# Patient Record
Sex: MALE | Race: BLACK OR AFRICAN AMERICAN | NOT HISPANIC OR LATINO | URBAN - METROPOLITAN AREA
[De-identification: names, ages, dates, MRNs, and addresses within clinical notes are randomized per-mention and may not be internally consistent; named-entity substitution may affect disease eponyms.]

---

## 2024-01-01 ENCOUNTER — INPATIENT (INPATIENT)
Facility: HOSPITAL | Age: 79
LOS: 5 days | End: 2024-08-08
Attending: INTERNAL MEDICINE | Admitting: INTERNAL MEDICINE
Payer: MEDICARE

## 2024-01-01 VITALS — HEIGHT: 67 IN | WEIGHT: 132.28 LBS

## 2024-01-01 DIAGNOSIS — G20.A1 PARKINSON'S DISEASE WITHOUT DYSKINESIA, WITHOUT MENTION OF FLUCTUATIONS: ICD-10-CM

## 2024-01-01 DIAGNOSIS — J96.02 ACUTE RESPIRATORY FAILURE WITH HYPERCAPNIA: ICD-10-CM

## 2024-01-01 DIAGNOSIS — I46.8 CARDIAC ARREST DUE TO OTHER UNDERLYING CONDITION: ICD-10-CM

## 2024-01-01 DIAGNOSIS — E87.20 ACIDOSIS, UNSPECIFIED: ICD-10-CM

## 2024-01-01 DIAGNOSIS — J96.01 ACUTE RESPIRATORY FAILURE WITH HYPOXIA: ICD-10-CM

## 2024-01-01 DIAGNOSIS — N17.0 ACUTE KIDNEY FAILURE WITH TUBULAR NECROSIS: ICD-10-CM

## 2024-01-01 DIAGNOSIS — Z90.79 ACQUIRED ABSENCE OF OTHER GENITAL ORGAN(S): ICD-10-CM

## 2024-01-01 DIAGNOSIS — I10 ESSENTIAL (PRIMARY) HYPERTENSION: ICD-10-CM

## 2024-01-01 DIAGNOSIS — E78.5 HYPERLIPIDEMIA, UNSPECIFIED: ICD-10-CM

## 2024-01-01 DIAGNOSIS — E87.6 HYPOKALEMIA: ICD-10-CM

## 2024-01-01 DIAGNOSIS — I46.9 CARDIAC ARREST, CAUSE UNSPECIFIED: ICD-10-CM

## 2024-01-01 DIAGNOSIS — R65.21 SEVERE SEPSIS WITH SEPTIC SHOCK: ICD-10-CM

## 2024-01-01 DIAGNOSIS — Z51.5 ENCOUNTER FOR PALLIATIVE CARE: ICD-10-CM

## 2024-01-01 DIAGNOSIS — A41.9 SEPSIS, UNSPECIFIED ORGANISM: ICD-10-CM

## 2024-01-01 DIAGNOSIS — J15.1 PNEUMONIA DUE TO PSEUDOMONAS: ICD-10-CM

## 2024-01-01 DIAGNOSIS — R57.0 CARDIOGENIC SHOCK: ICD-10-CM

## 2024-01-01 DIAGNOSIS — Z66 DO NOT RESUSCITATE: ICD-10-CM

## 2024-01-01 DIAGNOSIS — E89.0 POSTPROCEDURAL HYPOTHYROIDISM: ICD-10-CM

## 2024-01-01 DIAGNOSIS — Z85.46 PERSONAL HISTORY OF MALIGNANT NEOPLASM OF PROSTATE: ICD-10-CM

## 2024-01-01 DIAGNOSIS — G93.1 ANOXIC BRAIN DAMAGE, NOT ELSEWHERE CLASSIFIED: ICD-10-CM

## 2024-01-01 LAB
-  AZTREONAM: SIGNIFICANT CHANGE UP
-  CEFEPIME: SIGNIFICANT CHANGE UP
-  CEFTAZIDIME: SIGNIFICANT CHANGE UP
-  CIPROFLOXACIN: SIGNIFICANT CHANGE UP
-  IMIPENEM: SIGNIFICANT CHANGE UP
-  LEVOFLOXACIN: SIGNIFICANT CHANGE UP
-  MEROPENEM: SIGNIFICANT CHANGE UP
-  PIPERACILLIN/TAZOBACTAM: SIGNIFICANT CHANGE UP
A1C WITH ESTIMATED AVERAGE GLUCOSE RESULT: 5.4 % — SIGNIFICANT CHANGE UP (ref 4–5.6)
ALBUMIN SERPL ELPH-MCNC: 2.5 G/DL — LOW (ref 3.3–5)
ALBUMIN SERPL ELPH-MCNC: 2.7 G/DL — LOW (ref 3.3–5)
ALBUMIN SERPL ELPH-MCNC: 2.8 G/DL — LOW (ref 3.3–5)
ALBUMIN SERPL ELPH-MCNC: 3.1 G/DL — LOW (ref 3.3–5)
ALBUMIN SERPL ELPH-MCNC: 3.3 G/DL — SIGNIFICANT CHANGE UP (ref 3.3–5)
ALP SERPL-CCNC: 139 U/L — HIGH (ref 40–120)
ALP SERPL-CCNC: 69 U/L — SIGNIFICANT CHANGE UP (ref 40–120)
ALP SERPL-CCNC: 78 U/L — SIGNIFICANT CHANGE UP (ref 40–120)
ALP SERPL-CCNC: 83 U/L — SIGNIFICANT CHANGE UP (ref 40–120)
ALP SERPL-CCNC: 84 U/L — SIGNIFICANT CHANGE UP (ref 40–120)
ALP SERPL-CCNC: 88 U/L — SIGNIFICANT CHANGE UP (ref 40–120)
ALP SERPL-CCNC: 95 U/L — SIGNIFICANT CHANGE UP (ref 40–120)
ALT FLD-CCNC: 107 U/L — HIGH (ref 12–78)
ALT FLD-CCNC: 152 U/L — HIGH (ref 12–78)
ALT FLD-CCNC: 158 U/L — HIGH (ref 12–78)
ALT FLD-CCNC: 36 U/L — SIGNIFICANT CHANGE UP (ref 12–78)
ALT FLD-CCNC: 41 U/L — SIGNIFICANT CHANGE UP (ref 12–78)
ALT FLD-CCNC: 54 U/L — SIGNIFICANT CHANGE UP (ref 12–78)
ALT FLD-CCNC: 83 U/L — HIGH (ref 12–78)
ANION GAP SERPL CALC-SCNC: 14 MMOL/L — SIGNIFICANT CHANGE UP (ref 5–17)
ANION GAP SERPL CALC-SCNC: 15 MMOL/L — SIGNIFICANT CHANGE UP (ref 5–17)
ANION GAP SERPL CALC-SCNC: 18 MMOL/L — HIGH (ref 5–17)
ANION GAP SERPL CALC-SCNC: 19 MMOL/L — HIGH (ref 5–17)
ANION GAP SERPL CALC-SCNC: 4 MMOL/L — LOW (ref 5–17)
ANION GAP SERPL CALC-SCNC: 8 MMOL/L — SIGNIFICANT CHANGE UP (ref 5–17)
ANION GAP SERPL CALC-SCNC: 9 MMOL/L — SIGNIFICANT CHANGE UP (ref 5–17)
ANION GAP SERPL CALC-SCNC: 9 MMOL/L — SIGNIFICANT CHANGE UP (ref 5–17)
APPEARANCE UR: CLEAR — SIGNIFICANT CHANGE UP
APTT BLD: 45.3 SEC — HIGH (ref 24.5–35.6)
AST SERPL-CCNC: 140 U/L — HIGH (ref 15–37)
AST SERPL-CCNC: 207 U/L — HIGH (ref 15–37)
AST SERPL-CCNC: 225 U/L — HIGH (ref 15–37)
AST SERPL-CCNC: 303 U/L — HIGH (ref 15–37)
AST SERPL-CCNC: 360 U/L — HIGH (ref 15–37)
AST SERPL-CCNC: 419 U/L — HIGH (ref 15–37)
AST SERPL-CCNC: 68 U/L — HIGH (ref 15–37)
BACTERIA # UR AUTO: ABNORMAL /HPF
BASE EXCESS BLDA CALC-SCNC: -13.4 MMOL/L — LOW (ref -2–3)
BASOPHILS # BLD AUTO: 0 K/UL — SIGNIFICANT CHANGE UP (ref 0–0.2)
BASOPHILS # BLD AUTO: 0.02 K/UL — SIGNIFICANT CHANGE UP (ref 0–0.2)
BASOPHILS # BLD AUTO: 0.02 K/UL — SIGNIFICANT CHANGE UP (ref 0–0.2)
BASOPHILS # BLD AUTO: 0.06 K/UL — SIGNIFICANT CHANGE UP (ref 0–0.2)
BASOPHILS NFR BLD AUTO: 0 % — SIGNIFICANT CHANGE UP (ref 0–2)
BASOPHILS NFR BLD AUTO: 0.1 % — SIGNIFICANT CHANGE UP (ref 0–2)
BASOPHILS NFR BLD AUTO: 0.2 % — SIGNIFICANT CHANGE UP (ref 0–2)
BASOPHILS NFR BLD AUTO: 0.3 % — SIGNIFICANT CHANGE UP (ref 0–2)
BILIRUB SERPL-MCNC: 0.4 MG/DL — SIGNIFICANT CHANGE UP (ref 0.2–1.2)
BILIRUB SERPL-MCNC: 0.5 MG/DL — SIGNIFICANT CHANGE UP (ref 0.2–1.2)
BILIRUB SERPL-MCNC: 0.6 MG/DL — SIGNIFICANT CHANGE UP (ref 0.2–1.2)
BILIRUB SERPL-MCNC: 0.7 MG/DL — SIGNIFICANT CHANGE UP (ref 0.2–1.2)
BILIRUB SERPL-MCNC: 0.8 MG/DL — SIGNIFICANT CHANGE UP (ref 0.2–1.2)
BILIRUB UR-MCNC: NEGATIVE — SIGNIFICANT CHANGE UP
BLOOD GAS COMMENTS ARTERIAL: SIGNIFICANT CHANGE UP
BUN SERPL-MCNC: 16 MG/DL — SIGNIFICANT CHANGE UP (ref 7–23)
BUN SERPL-MCNC: 21 MG/DL — SIGNIFICANT CHANGE UP (ref 7–23)
BUN SERPL-MCNC: 25 MG/DL — HIGH (ref 7–23)
BUN SERPL-MCNC: 27 MG/DL — HIGH (ref 7–23)
BUN SERPL-MCNC: 35 MG/DL — HIGH (ref 7–23)
BUN SERPL-MCNC: 41 MG/DL — HIGH (ref 7–23)
BUN SERPL-MCNC: 42 MG/DL — HIGH (ref 7–23)
BUN SERPL-MCNC: 43 MG/DL — HIGH (ref 7–23)
CALCIUM SERPL-MCNC: 10.1 MG/DL — SIGNIFICANT CHANGE UP (ref 8.5–10.1)
CALCIUM SERPL-MCNC: 7.2 MG/DL — LOW (ref 8.5–10.1)
CALCIUM SERPL-MCNC: 7.5 MG/DL — LOW (ref 8.5–10.1)
CALCIUM SERPL-MCNC: 7.6 MG/DL — LOW (ref 8.5–10.1)
CALCIUM SERPL-MCNC: 8 MG/DL — LOW (ref 8.5–10.1)
CALCIUM SERPL-MCNC: 8.1 MG/DL — LOW (ref 8.5–10.1)
CALCIUM SERPL-MCNC: 8.2 MG/DL — LOW (ref 8.5–10.1)
CALCIUM SERPL-MCNC: 8.8 MG/DL — SIGNIFICANT CHANGE UP (ref 8.5–10.1)
CHLORIDE SERPL-SCNC: 109 MMOL/L — HIGH (ref 96–108)
CHLORIDE SERPL-SCNC: 110 MMOL/L — HIGH (ref 96–108)
CHLORIDE SERPL-SCNC: 111 MMOL/L — HIGH (ref 96–108)
CHLORIDE SERPL-SCNC: 112 MMOL/L — HIGH (ref 96–108)
CHLORIDE SERPL-SCNC: 112 MMOL/L — HIGH (ref 96–108)
CHLORIDE SERPL-SCNC: 114 MMOL/L — HIGH (ref 96–108)
CO2 BLDA-SCNC: 19 MMOL/L — SIGNIFICANT CHANGE UP (ref 19–24)
CO2 SERPL-SCNC: 13 MMOL/L — LOW (ref 22–31)
CO2 SERPL-SCNC: 13 MMOL/L — LOW (ref 22–31)
CO2 SERPL-SCNC: 17 MMOL/L — LOW (ref 22–31)
CO2 SERPL-SCNC: 19 MMOL/L — LOW (ref 22–31)
CO2 SERPL-SCNC: 26 MMOL/L — SIGNIFICANT CHANGE UP (ref 22–31)
CO2 SERPL-SCNC: 26 MMOL/L — SIGNIFICANT CHANGE UP (ref 22–31)
CO2 SERPL-SCNC: 27 MMOL/L — SIGNIFICANT CHANGE UP (ref 22–31)
CO2 SERPL-SCNC: 29 MMOL/L — SIGNIFICANT CHANGE UP (ref 22–31)
COLOR SPEC: YELLOW — SIGNIFICANT CHANGE UP
CREAT SERPL-MCNC: 1.89 MG/DL — HIGH (ref 0.5–1.3)
CREAT SERPL-MCNC: 1.96 MG/DL — HIGH (ref 0.5–1.3)
CREAT SERPL-MCNC: 2.21 MG/DL — HIGH (ref 0.5–1.3)
CREAT SERPL-MCNC: 2.37 MG/DL — HIGH (ref 0.5–1.3)
CREAT SERPL-MCNC: 2.41 MG/DL — HIGH (ref 0.5–1.3)
CREAT SERPL-MCNC: 2.44 MG/DL — HIGH (ref 0.5–1.3)
CREAT SERPL-MCNC: 2.54 MG/DL — HIGH (ref 0.5–1.3)
CREAT SERPL-MCNC: 2.99 MG/DL — HIGH (ref 0.5–1.3)
CULTURE RESULTS: ABNORMAL
CULTURE RESULTS: NO GROWTH — SIGNIFICANT CHANGE UP
CULTURE RESULTS: SIGNIFICANT CHANGE UP
CULTURE RESULTS: SIGNIFICANT CHANGE UP
DIFF PNL FLD: ABNORMAL
EGFR: 20 ML/MIN/1.73M2 — LOW
EGFR: 25 ML/MIN/1.73M2 — LOW
EGFR: 26 ML/MIN/1.73M2 — LOW
EGFR: 26 ML/MIN/1.73M2 — LOW
EGFR: 27 ML/MIN/1.73M2 — LOW
EGFR: 29 ML/MIN/1.73M2 — LOW
EGFR: 34 ML/MIN/1.73M2 — LOW
EGFR: 35 ML/MIN/1.73M2 — LOW
EOSINOPHIL # BLD AUTO: 0 K/UL — SIGNIFICANT CHANGE UP (ref 0–0.5)
EOSINOPHIL # BLD AUTO: 0 K/UL — SIGNIFICANT CHANGE UP (ref 0–0.5)
EOSINOPHIL # BLD AUTO: 0.01 K/UL — SIGNIFICANT CHANGE UP (ref 0–0.5)
EOSINOPHIL # BLD AUTO: 0.01 K/UL — SIGNIFICANT CHANGE UP (ref 0–0.5)
EOSINOPHIL NFR BLD AUTO: 0 % — SIGNIFICANT CHANGE UP (ref 0–6)
EOSINOPHIL NFR BLD AUTO: 0.1 % — SIGNIFICANT CHANGE UP (ref 0–6)
EPI CELLS # UR: PRESENT
ESTIMATED AVERAGE GLUCOSE: 108 MG/DL — SIGNIFICANT CHANGE UP (ref 68–114)
GAS PNL BLDA: SIGNIFICANT CHANGE UP
GLUCOSE BLDC GLUCOMTR-MCNC: 108 MG/DL — HIGH (ref 70–99)
GLUCOSE BLDC GLUCOMTR-MCNC: 124 MG/DL — HIGH (ref 70–99)
GLUCOSE BLDC GLUCOMTR-MCNC: 126 MG/DL — HIGH (ref 70–99)
GLUCOSE BLDC GLUCOMTR-MCNC: 128 MG/DL — HIGH (ref 70–99)
GLUCOSE BLDC GLUCOMTR-MCNC: 129 MG/DL — HIGH (ref 70–99)
GLUCOSE BLDC GLUCOMTR-MCNC: 133 MG/DL — HIGH (ref 70–99)
GLUCOSE BLDC GLUCOMTR-MCNC: 134 MG/DL — HIGH (ref 70–99)
GLUCOSE BLDC GLUCOMTR-MCNC: 138 MG/DL — HIGH (ref 70–99)
GLUCOSE BLDC GLUCOMTR-MCNC: 139 MG/DL — HIGH (ref 70–99)
GLUCOSE BLDC GLUCOMTR-MCNC: 140 MG/DL — HIGH (ref 70–99)
GLUCOSE BLDC GLUCOMTR-MCNC: 140 MG/DL — HIGH (ref 70–99)
GLUCOSE BLDC GLUCOMTR-MCNC: 141 MG/DL — HIGH (ref 70–99)
GLUCOSE BLDC GLUCOMTR-MCNC: 146 MG/DL — HIGH (ref 70–99)
GLUCOSE BLDC GLUCOMTR-MCNC: 152 MG/DL — HIGH (ref 70–99)
GLUCOSE BLDC GLUCOMTR-MCNC: 155 MG/DL — HIGH (ref 70–99)
GLUCOSE BLDC GLUCOMTR-MCNC: 168 MG/DL — HIGH (ref 70–99)
GLUCOSE BLDC GLUCOMTR-MCNC: 176 MG/DL — HIGH (ref 70–99)
GLUCOSE BLDC GLUCOMTR-MCNC: 213 MG/DL — HIGH (ref 70–99)
GLUCOSE BLDC GLUCOMTR-MCNC: 232 MG/DL — HIGH (ref 70–99)
GLUCOSE BLDC GLUCOMTR-MCNC: 279 MG/DL — HIGH (ref 70–99)
GLUCOSE SERPL-MCNC: 140 MG/DL — HIGH (ref 70–99)
GLUCOSE SERPL-MCNC: 146 MG/DL — HIGH (ref 70–99)
GLUCOSE SERPL-MCNC: 146 MG/DL — HIGH (ref 70–99)
GLUCOSE SERPL-MCNC: 227 MG/DL — HIGH (ref 70–99)
GLUCOSE SERPL-MCNC: 267 MG/DL — HIGH (ref 70–99)
GLUCOSE SERPL-MCNC: 292 MG/DL — HIGH (ref 70–99)
GLUCOSE SERPL-MCNC: 339 MG/DL — HIGH (ref 70–99)
GLUCOSE SERPL-MCNC: 93 MG/DL — SIGNIFICANT CHANGE UP (ref 70–99)
GLUCOSE UR QL: 100 MG/DL
GRAM STN FLD: ABNORMAL
GRAM STN FLD: ABNORMAL
HCO3 BLDA-SCNC: 17 MMOL/L — LOW (ref 21–28)
HCT VFR BLD CALC: 29.7 % — LOW (ref 39–50)
HCT VFR BLD CALC: 32.5 % — LOW (ref 39–50)
HCT VFR BLD CALC: 33.2 % — LOW (ref 39–50)
HCT VFR BLD CALC: 36.4 % — LOW (ref 39–50)
HCT VFR BLD CALC: 38.2 % — LOW (ref 39–50)
HCT VFR BLD CALC: 40.4 % — SIGNIFICANT CHANGE UP (ref 39–50)
HGB BLD-MCNC: 10.6 G/DL — LOW (ref 13–17)
HGB BLD-MCNC: 10.8 G/DL — LOW (ref 13–17)
HGB BLD-MCNC: 11.5 G/DL — LOW (ref 13–17)
HGB BLD-MCNC: 11.9 G/DL — LOW (ref 13–17)
HGB BLD-MCNC: 12.8 G/DL — LOW (ref 13–17)
HGB BLD-MCNC: 9.5 G/DL — LOW (ref 13–17)
HOROWITZ INDEX BLDA+IHG-RTO: 50 — SIGNIFICANT CHANGE UP
IMM GRANULOCYTES NFR BLD AUTO: 0.2 % — SIGNIFICANT CHANGE UP (ref 0–0.9)
IMM GRANULOCYTES NFR BLD AUTO: 1 % — HIGH (ref 0–0.9)
IMM GRANULOCYTES NFR BLD AUTO: 3.3 % — HIGH (ref 0–0.9)
INR BLD: 1.02 RATIO — SIGNIFICANT CHANGE UP (ref 0.85–1.18)
KETONES UR-MCNC: NEGATIVE MG/DL — SIGNIFICANT CHANGE UP
LACTATE SERPL-SCNC: 1.9 MMOL/L — SIGNIFICANT CHANGE UP (ref 0.7–2)
LACTATE SERPL-SCNC: 11.1 MMOL/L — CRITICAL HIGH (ref 0.7–2)
LACTATE SERPL-SCNC: 11.6 MMOL/L — CRITICAL HIGH (ref 0.7–2)
LACTATE SERPL-SCNC: 2 MMOL/L — SIGNIFICANT CHANGE UP (ref 0.7–2)
LACTATE SERPL-SCNC: 7.3 MMOL/L — CRITICAL HIGH (ref 0.7–2)
LEGIONELLA AG UR QL: NEGATIVE — SIGNIFICANT CHANGE UP
LEUKOCYTE ESTERASE UR-ACNC: NEGATIVE — SIGNIFICANT CHANGE UP
LYMPHOCYTES # BLD AUTO: 0.78 K/UL — LOW (ref 1–3.3)
LYMPHOCYTES # BLD AUTO: 0.88 K/UL — LOW (ref 1–3.3)
LYMPHOCYTES # BLD AUTO: 1.1 K/UL — SIGNIFICANT CHANGE UP (ref 1–3.3)
LYMPHOCYTES # BLD AUTO: 3.7 % — LOW (ref 13–44)
LYMPHOCYTES # BLD AUTO: 3.9 % — LOW (ref 13–44)
LYMPHOCYTES # BLD AUTO: 6.85 K/UL — HIGH (ref 1–3.3)
LYMPHOCYTES # BLD AUTO: 64 % — HIGH (ref 13–44)
LYMPHOCYTES # BLD AUTO: 8.4 % — LOW (ref 13–44)
M PNEUMO IGM SER-ACNC: 0.12 INDEX — SIGNIFICANT CHANGE UP (ref 0–0.9)
MAGNESIUM SERPL-MCNC: 2.2 MG/DL — SIGNIFICANT CHANGE UP (ref 1.6–2.6)
MAGNESIUM SERPL-MCNC: 2.3 MG/DL — SIGNIFICANT CHANGE UP (ref 1.6–2.6)
MAGNESIUM SERPL-MCNC: 2.3 MG/DL — SIGNIFICANT CHANGE UP (ref 1.6–2.6)
MAGNESIUM SERPL-MCNC: 2.4 MG/DL — SIGNIFICANT CHANGE UP (ref 1.6–2.6)
MAGNESIUM SERPL-MCNC: 2.5 MG/DL — SIGNIFICANT CHANGE UP (ref 1.6–2.6)
MAGNESIUM SERPL-MCNC: 2.6 MG/DL — SIGNIFICANT CHANGE UP (ref 1.6–2.6)
MANUAL SMEAR VERIFICATION: SIGNIFICANT CHANGE UP
MANUAL SMEAR VERIFICATION: SIGNIFICANT CHANGE UP
MCHC RBC-ENTMCNC: 29.6 PG — SIGNIFICANT CHANGE UP (ref 27–34)
MCHC RBC-ENTMCNC: 30 PG — SIGNIFICANT CHANGE UP (ref 27–34)
MCHC RBC-ENTMCNC: 30.1 G/DL — LOW (ref 32–36)
MCHC RBC-ENTMCNC: 30.3 PG — SIGNIFICANT CHANGE UP (ref 27–34)
MCHC RBC-ENTMCNC: 30.3 PG — SIGNIFICANT CHANGE UP (ref 27–34)
MCHC RBC-ENTMCNC: 30.4 PG — SIGNIFICANT CHANGE UP (ref 27–34)
MCHC RBC-ENTMCNC: 30.7 PG — SIGNIFICANT CHANGE UP (ref 27–34)
MCHC RBC-ENTMCNC: 31.7 G/DL — LOW (ref 32–36)
MCHC RBC-ENTMCNC: 32 G/DL — SIGNIFICANT CHANGE UP (ref 32–36)
MCHC RBC-ENTMCNC: 32.5 G/DL — SIGNIFICANT CHANGE UP (ref 32–36)
MCHC RBC-ENTMCNC: 32.6 G/DL — SIGNIFICANT CHANGE UP (ref 32–36)
MCHC RBC-ENTMCNC: 32.7 G/DL — SIGNIFICANT CHANGE UP (ref 32–36)
MCV RBC AUTO: 92.2 FL — SIGNIFICANT CHANGE UP (ref 80–100)
MCV RBC AUTO: 93.1 FL — SIGNIFICANT CHANGE UP (ref 80–100)
MCV RBC AUTO: 93.8 FL — SIGNIFICANT CHANGE UP (ref 80–100)
MCV RBC AUTO: 94.6 FL — SIGNIFICANT CHANGE UP (ref 80–100)
MCV RBC AUTO: 95.5 FL — SIGNIFICANT CHANGE UP (ref 80–100)
MCV RBC AUTO: 98.2 FL — SIGNIFICANT CHANGE UP (ref 80–100)
METHOD TYPE: SIGNIFICANT CHANGE UP
MONOCYTES # BLD AUTO: 0.32 K/UL — SIGNIFICANT CHANGE UP (ref 0–0.9)
MONOCYTES # BLD AUTO: 0.43 K/UL — SIGNIFICANT CHANGE UP (ref 0–0.9)
MONOCYTES # BLD AUTO: 0.69 K/UL — SIGNIFICANT CHANGE UP (ref 0–0.9)
MONOCYTES # BLD AUTO: 0.7 K/UL — SIGNIFICANT CHANGE UP (ref 0–0.9)
MONOCYTES NFR BLD AUTO: 1.9 % — LOW (ref 2–14)
MONOCYTES NFR BLD AUTO: 3 % — SIGNIFICANT CHANGE UP (ref 2–14)
MONOCYTES NFR BLD AUTO: 3.3 % — SIGNIFICANT CHANGE UP (ref 2–14)
MONOCYTES NFR BLD AUTO: 5.2 % — SIGNIFICANT CHANGE UP (ref 2–14)
MRSA PCR RESULT.: SIGNIFICANT CHANGE UP
MYCOPLASMA AG SPEC QL: NEGATIVE — SIGNIFICANT CHANGE UP
MYELOCYTES NFR BLD: 2 % — HIGH (ref 0–0)
NEUTROPHILS # BLD AUTO: 11.3 K/UL — HIGH (ref 1.8–7.4)
NEUTROPHILS # BLD AUTO: 19.22 K/UL — HIGH (ref 1.8–7.4)
NEUTROPHILS # BLD AUTO: 20.42 K/UL — HIGH (ref 1.8–7.4)
NEUTROPHILS # BLD AUTO: 3.32 K/UL — SIGNIFICANT CHANGE UP (ref 1.8–7.4)
NEUTROPHILS NFR BLD AUTO: 28 % — LOW (ref 43–77)
NEUTROPHILS NFR BLD AUTO: 85.9 % — HIGH (ref 43–77)
NEUTROPHILS NFR BLD AUTO: 90.6 % — HIGH (ref 43–77)
NEUTROPHILS NFR BLD AUTO: 91.9 % — HIGH (ref 43–77)
NEUTS BAND # BLD: 3 % — SIGNIFICANT CHANGE UP (ref 0–8)
NITRITE UR-MCNC: NEGATIVE — SIGNIFICANT CHANGE UP
NRBC # BLD: 0 /100 WBCS — SIGNIFICANT CHANGE UP (ref 0–0)
NRBC # BLD: SIGNIFICANT CHANGE UP /100 WBCS (ref 0–0)
ORGANISM # SPEC MICROSCOPIC CNT: ABNORMAL
ORGANISM # SPEC MICROSCOPIC CNT: SIGNIFICANT CHANGE UP
PCO2 BLDA: 57 MMHG — HIGH (ref 32–46)
PH BLDA: 7.08 — CRITICAL LOW (ref 7.35–7.45)
PH UR: 6.5 — SIGNIFICANT CHANGE UP (ref 5–8)
PHOSPHATE SERPL-MCNC: 1.8 MG/DL — LOW (ref 2.5–4.5)
PHOSPHATE SERPL-MCNC: 3.3 MG/DL — SIGNIFICANT CHANGE UP (ref 2.5–4.5)
PHOSPHATE SERPL-MCNC: 3.3 MG/DL — SIGNIFICANT CHANGE UP (ref 2.5–4.5)
PHOSPHATE SERPL-MCNC: 3.4 MG/DL — SIGNIFICANT CHANGE UP (ref 2.5–4.5)
PHOSPHATE SERPL-MCNC: 3.6 MG/DL — SIGNIFICANT CHANGE UP (ref 2.5–4.5)
PHOSPHATE SERPL-MCNC: 4.2 MG/DL — SIGNIFICANT CHANGE UP (ref 2.5–4.5)
PLAT MORPH BLD: NORMAL — SIGNIFICANT CHANGE UP
PLAT MORPH BLD: NORMAL — SIGNIFICANT CHANGE UP
PLATELET # BLD AUTO: 147 K/UL — LOW (ref 150–400)
PLATELET # BLD AUTO: 163 K/UL — SIGNIFICANT CHANGE UP (ref 150–400)
PLATELET # BLD AUTO: 165 K/UL — SIGNIFICANT CHANGE UP (ref 150–400)
PLATELET # BLD AUTO: 180 K/UL — SIGNIFICANT CHANGE UP (ref 150–400)
PLATELET # BLD AUTO: 182 K/UL — SIGNIFICANT CHANGE UP (ref 150–400)
PLATELET # BLD AUTO: 216 K/UL — SIGNIFICANT CHANGE UP (ref 150–400)
PO2 BLDA: 124 MMHG — HIGH (ref 83–108)
POTASSIUM SERPL-MCNC: 2.7 MMOL/L — CRITICAL LOW (ref 3.5–5.3)
POTASSIUM SERPL-MCNC: 2.8 MMOL/L — CRITICAL LOW (ref 3.5–5.3)
POTASSIUM SERPL-MCNC: 3.2 MMOL/L — LOW (ref 3.5–5.3)
POTASSIUM SERPL-MCNC: 3.8 MMOL/L — SIGNIFICANT CHANGE UP (ref 3.5–5.3)
POTASSIUM SERPL-MCNC: 4 MMOL/L — SIGNIFICANT CHANGE UP (ref 3.5–5.3)
POTASSIUM SERPL-MCNC: 4.1 MMOL/L — SIGNIFICANT CHANGE UP (ref 3.5–5.3)
POTASSIUM SERPL-MCNC: 4.5 MMOL/L — SIGNIFICANT CHANGE UP (ref 3.5–5.3)
POTASSIUM SERPL-MCNC: 4.6 MMOL/L — SIGNIFICANT CHANGE UP (ref 3.5–5.3)
POTASSIUM SERPL-SCNC: 2.7 MMOL/L — CRITICAL LOW (ref 3.5–5.3)
POTASSIUM SERPL-SCNC: 2.8 MMOL/L — CRITICAL LOW (ref 3.5–5.3)
POTASSIUM SERPL-SCNC: 3.2 MMOL/L — LOW (ref 3.5–5.3)
POTASSIUM SERPL-SCNC: 3.8 MMOL/L — SIGNIFICANT CHANGE UP (ref 3.5–5.3)
POTASSIUM SERPL-SCNC: 4 MMOL/L — SIGNIFICANT CHANGE UP (ref 3.5–5.3)
POTASSIUM SERPL-SCNC: 4.1 MMOL/L — SIGNIFICANT CHANGE UP (ref 3.5–5.3)
POTASSIUM SERPL-SCNC: 4.5 MMOL/L — SIGNIFICANT CHANGE UP (ref 3.5–5.3)
POTASSIUM SERPL-SCNC: 4.6 MMOL/L — SIGNIFICANT CHANGE UP (ref 3.5–5.3)
PROT SERPL-MCNC: 5.8 GM/DL — LOW (ref 6–8.3)
PROT SERPL-MCNC: 6.1 GM/DL — SIGNIFICANT CHANGE UP (ref 6–8.3)
PROT SERPL-MCNC: 6.2 GM/DL — SIGNIFICANT CHANGE UP (ref 6–8.3)
PROT SERPL-MCNC: 6.3 GM/DL — SIGNIFICANT CHANGE UP (ref 6–8.3)
PROT SERPL-MCNC: 6.4 GM/DL — SIGNIFICANT CHANGE UP (ref 6–8.3)
PROT SERPL-MCNC: 6.5 GM/DL — SIGNIFICANT CHANGE UP (ref 6–8.3)
PROT SERPL-MCNC: 7 GM/DL — SIGNIFICANT CHANGE UP (ref 6–8.3)
PROT UR-MCNC: 300 MG/DL
PROTHROM AB SERPL-ACNC: 12.1 SEC — SIGNIFICANT CHANGE UP (ref 9.5–13)
RBC # BLD: 3.14 M/UL — LOW (ref 4.2–5.8)
RBC # BLD: 3.49 M/UL — LOW (ref 4.2–5.8)
RBC # BLD: 3.6 M/UL — LOW (ref 4.2–5.8)
RBC # BLD: 3.88 M/UL — LOW (ref 4.2–5.8)
RBC # BLD: 3.89 M/UL — LOW (ref 4.2–5.8)
RBC # BLD: 4.23 M/UL — SIGNIFICANT CHANGE UP (ref 4.2–5.8)
RBC # FLD: 13 % — SIGNIFICANT CHANGE UP (ref 10.3–14.5)
RBC # FLD: 13 % — SIGNIFICANT CHANGE UP (ref 10.3–14.5)
RBC # FLD: 13.1 % — SIGNIFICANT CHANGE UP (ref 10.3–14.5)
RBC # FLD: 13.2 % — SIGNIFICANT CHANGE UP (ref 10.3–14.5)
RBC # FLD: 13.2 % — SIGNIFICANT CHANGE UP (ref 10.3–14.5)
RBC # FLD: 13.4 % — SIGNIFICANT CHANGE UP (ref 10.3–14.5)
RBC BLD AUTO: NORMAL — SIGNIFICANT CHANGE UP
RBC BLD AUTO: NORMAL — SIGNIFICANT CHANGE UP
RBC CASTS # UR COMP ASSIST: 10 /HPF — HIGH (ref 0–4)
S AUREUS DNA NOSE QL NAA+PROBE: SIGNIFICANT CHANGE UP
S PNEUM AG UR QL: NEGATIVE — SIGNIFICANT CHANGE UP
SAO2 % BLDA: 98.6 % — HIGH (ref 94–98)
SODIUM SERPL-SCNC: 142 MMOL/L — SIGNIFICANT CHANGE UP (ref 135–145)
SODIUM SERPL-SCNC: 143 MMOL/L — SIGNIFICANT CHANGE UP (ref 135–145)
SODIUM SERPL-SCNC: 143 MMOL/L — SIGNIFICANT CHANGE UP (ref 135–145)
SODIUM SERPL-SCNC: 144 MMOL/L — SIGNIFICANT CHANGE UP (ref 135–145)
SODIUM SERPL-SCNC: 144 MMOL/L — SIGNIFICANT CHANGE UP (ref 135–145)
SODIUM SERPL-SCNC: 145 MMOL/L — SIGNIFICANT CHANGE UP (ref 135–145)
SODIUM SERPL-SCNC: 146 MMOL/L — HIGH (ref 135–145)
SODIUM SERPL-SCNC: 147 MMOL/L — HIGH (ref 135–145)
SP GR SPEC: 1.01 — SIGNIFICANT CHANGE UP (ref 1–1.03)
SPECIMEN SOURCE: SIGNIFICANT CHANGE UP
T3 SERPL-MCNC: 72 NG/DL — LOW (ref 80–200)
T4 AB SER-ACNC: 6.6 UG/DL — SIGNIFICANT CHANGE UP (ref 4.6–12)
TROPONIN I, HIGH SENSITIVITY RESULT: 86.9 NG/L — HIGH
TSH SERPL-MCNC: 0.34 UU/ML — LOW (ref 0.36–3.74)
UROBILINOGEN FLD QL: 1 MG/DL — SIGNIFICANT CHANGE UP (ref 0.2–1)
WBC # BLD: 10.71 K/UL — HIGH (ref 3.8–10.5)
WBC # BLD: 13.15 K/UL — HIGH (ref 3.8–10.5)
WBC # BLD: 20.93 K/UL — HIGH (ref 3.8–10.5)
WBC # BLD: 21.21 K/UL — HIGH (ref 3.8–10.5)
WBC # BLD: 22.54 K/UL — HIGH (ref 3.8–10.5)
WBC # BLD: 22.58 K/UL — HIGH (ref 3.8–10.5)
WBC # FLD AUTO: 10.71 K/UL — HIGH (ref 3.8–10.5)
WBC # FLD AUTO: 13.15 K/UL — HIGH (ref 3.8–10.5)
WBC # FLD AUTO: 20.93 K/UL — HIGH (ref 3.8–10.5)
WBC # FLD AUTO: 21.21 K/UL — HIGH (ref 3.8–10.5)
WBC # FLD AUTO: 22.54 K/UL — HIGH (ref 3.8–10.5)
WBC # FLD AUTO: 22.58 K/UL — HIGH (ref 3.8–10.5)
WBC UR QL: 3 /HPF — SIGNIFICANT CHANGE UP (ref 0–5)

## 2024-01-01 RX ORDER — MAGNESIUM SULFATE 500 MG/ML
1 VIAL (ML) INJECTION ONCE
Refills: 0 | Status: COMPLETED | OUTPATIENT
Start: 2024-01-01 | End: 2024-01-01

## 2024-01-01 RX ORDER — BUPIVACAINE HCL/0.9 % NACL/PF 0.25 %
0.05 PLASTIC BAG, INJECTION (ML) EPIDURAL
Qty: 16 | Refills: 0 | Status: DISCONTINUED | OUTPATIENT
Start: 2024-01-01 | End: 2024-01-01

## 2024-01-01 RX ORDER — CARBIDOPA AND LEVODOPA 25; 100 MG/1; MG/1
1.5 TABLET ORAL
Refills: 0 | DISCHARGE

## 2024-01-01 RX ORDER — AMLODIPINE BESYLATE 2.5 MG/1
1 TABLET ORAL
Refills: 0 | DISCHARGE

## 2024-01-01 RX ORDER — CHLORHEXIDINE GLUCONATE 500 MG/1
15 CLOTH TOPICAL EVERY 12 HOURS
Refills: 0 | Status: DISCONTINUED | OUTPATIENT
Start: 2024-01-01 | End: 2024-01-01

## 2024-01-01 RX ORDER — MIRABEGRON 50 MG/1
1 TABLET, FILM COATED, EXTENDED RELEASE ORAL
Refills: 0 | DISCHARGE

## 2024-01-01 RX ORDER — POTASSIUM CHLORIDE 1500 MG/1
40 TABLET, EXTENDED RELEASE ORAL ONCE
Refills: 0 | Status: COMPLETED | OUTPATIENT
Start: 2024-01-01 | End: 2024-01-01

## 2024-01-01 RX ORDER — PIPERACILLIN SODIUM, TAZOBACTAM SODIUM 3; .375 G/15ML; G/15ML
3.38 INJECTION, POWDER, LYOPHILIZED, FOR SOLUTION INTRAVENOUS ONCE
Refills: 0 | Status: COMPLETED | OUTPATIENT
Start: 2024-01-01 | End: 2024-01-01

## 2024-01-01 RX ORDER — HYDRALAZINE HYDROCHLORIDE 100 MG/1
10 TABLET ORAL ONCE
Refills: 0 | Status: COMPLETED | OUTPATIENT
Start: 2024-01-01 | End: 2024-01-01

## 2024-01-01 RX ORDER — PIPERACILLIN SODIUM, TAZOBACTAM SODIUM 3; .375 G/15ML; G/15ML
4.5 INJECTION, POWDER, LYOPHILIZED, FOR SOLUTION INTRAVENOUS EVERY 8 HOURS
Refills: 0 | Status: DISCONTINUED | OUTPATIENT
Start: 2024-01-01 | End: 2024-01-01

## 2024-01-01 RX ORDER — CARBIDOPA AND LEVODOPA 25; 100 MG/1; MG/1
1 TABLET ORAL THREE TIMES A DAY
Refills: 0 | Status: DISCONTINUED | OUTPATIENT
Start: 2024-01-01 | End: 2024-01-01

## 2024-01-01 RX ORDER — POTASSIUM PHOSPHATE, MONOBASIC AND POTASSIUM PHOSPHATE, DIBASIC 224; 236 MG/ML; MG/ML
30 INJECTION, SOLUTION INTRAVENOUS ONCE
Refills: 0 | Status: COMPLETED | OUTPATIENT
Start: 2024-01-01 | End: 2024-01-01

## 2024-01-01 RX ORDER — MIRTAZAPINE 15 MG
1 TABLET ORAL
Refills: 0 | DISCHARGE

## 2024-01-01 RX ORDER — PIPERACILLIN SODIUM, TAZOBACTAM SODIUM 3; .375 G/15ML; G/15ML
3.38 INJECTION, POWDER, LYOPHILIZED, FOR SOLUTION INTRAVENOUS ONCE
Refills: 0 | Status: DISCONTINUED | OUTPATIENT
Start: 2024-01-01 | End: 2024-01-01

## 2024-01-01 RX ORDER — CHLORHEXIDINE GLUCONATE 500 MG/1
1 CLOTH TOPICAL
Refills: 0 | Status: DISCONTINUED | OUTPATIENT
Start: 2024-01-01 | End: 2024-01-01

## 2024-01-01 RX ORDER — HYDROCORTISONE ACETATE
100 POWDER (GRAM) MISCELLANEOUS EVERY 8 HOURS
Refills: 0 | Status: DISCONTINUED | OUTPATIENT
Start: 2024-01-01 | End: 2024-01-01

## 2024-01-01 RX ORDER — MULTIVITAMIN/IRON/FOLIC ACID 18MG-0.4MG
1 TABLET ORAL
Refills: 0 | DISCHARGE

## 2024-01-01 RX ORDER — HYDROCORTISONE ACETATE
50 POWDER (GRAM) MISCELLANEOUS EVERY 8 HOURS
Refills: 0 | Status: DISCONTINUED | OUTPATIENT
Start: 2024-01-01 | End: 2024-01-01

## 2024-01-01 RX ORDER — LISINOPRIL 10 MG/1
1 TABLET ORAL
Refills: 0 | DISCHARGE

## 2024-01-01 RX ORDER — AZITHROMYCIN 250 MG
500 TABLET ORAL EVERY 24 HOURS
Refills: 0 | Status: DISCONTINUED | OUTPATIENT
Start: 2024-01-01 | End: 2024-01-01

## 2024-01-01 RX ORDER — PIPERACILLIN SODIUM, TAZOBACTAM SODIUM 3; .375 G/15ML; G/15ML
3.38 INJECTION, POWDER, LYOPHILIZED, FOR SOLUTION INTRAVENOUS EVERY 8 HOURS
Refills: 0 | Status: DISCONTINUED | OUTPATIENT
Start: 2024-01-01 | End: 2024-01-01

## 2024-01-01 RX ORDER — PIPERACILLIN SODIUM, TAZOBACTAM SODIUM 3; .375 G/15ML; G/15ML
3.38 INJECTION, POWDER, LYOPHILIZED, FOR SOLUTION INTRAVENOUS EVERY 12 HOURS
Refills: 0 | Status: DISCONTINUED | OUTPATIENT
Start: 2024-01-01 | End: 2024-01-01

## 2024-01-01 RX ORDER — SODIUM BICARBONATE 0.9MEQ/ML
0.31 SYRINGE (ML) INTRAVENOUS
Qty: 150 | Refills: 0 | Status: COMPLETED | OUTPATIENT
Start: 2024-01-01 | End: 2024-01-01

## 2024-01-01 RX ORDER — HEPARIN SODIUM 1000 [USP'U]/ML
5000 INJECTION, SOLUTION INTRAVENOUS; SUBCUTANEOUS EVERY 8 HOURS
Refills: 0 | Status: DISCONTINUED | OUTPATIENT
Start: 2024-01-01 | End: 2024-01-01

## 2024-01-01 RX ORDER — EPINEPHRINE 1 MG/ML
2 VIAL (ML) INJECTION
Qty: 4 | Refills: 0 | Status: DISCONTINUED | OUTPATIENT
Start: 2024-01-01 | End: 2024-01-01

## 2024-01-01 RX ORDER — OMEPRAZOLE 100 %
1 POWDER (GRAM) MISCELLANEOUS
Refills: 0 | DISCHARGE

## 2024-01-01 RX ORDER — GABAPENTIN 400 MG/1
1 CAPSULE ORAL
Refills: 0 | DISCHARGE

## 2024-01-01 RX ORDER — FENTANYL CITRATE 1200 UG/1
0.5 LOZENGE ORAL; TRANSMUCOSAL
Qty: 2500 | Refills: 0 | Status: DISCONTINUED | OUTPATIENT
Start: 2024-01-01 | End: 2024-01-01

## 2024-01-01 RX ORDER — INSULIN LISPRO 100/ML
VIAL (ML) SUBCUTANEOUS EVERY 6 HOURS
Refills: 0 | Status: DISCONTINUED | OUTPATIENT
Start: 2024-01-01 | End: 2024-01-01

## 2024-01-01 RX ORDER — SODIUM BICARBONATE 0.9MEQ/ML
0.21 SYRINGE (ML) INTRAVENOUS
Qty: 150 | Refills: 0 | Status: DISCONTINUED | OUTPATIENT
Start: 2024-01-01 | End: 2024-01-01

## 2024-01-01 RX ORDER — VASOPRESSIN 40 [USP'U]/100ML
0.04 INJECTION INTRAVENOUS
Qty: 40 | Refills: 0 | Status: DISCONTINUED | OUTPATIENT
Start: 2024-01-01 | End: 2024-01-01

## 2024-01-01 RX ORDER — PANTOPRAZOLE SODIUM 20 MG/1
40 TABLET, DELAYED RELEASE ORAL DAILY
Refills: 0 | Status: DISCONTINUED | OUTPATIENT
Start: 2024-01-01 | End: 2024-01-01

## 2024-01-01 RX ADMIN — Medication 100 MILLIGRAM(S): at 05:05

## 2024-01-01 RX ADMIN — PIPERACILLIN SODIUM, TAZOBACTAM SODIUM 25 GRAM(S): 3; .375 INJECTION, POWDER, LYOPHILIZED, FOR SOLUTION INTRAVENOUS at 06:04

## 2024-01-01 RX ADMIN — Medication 100 MILLIGRAM(S): at 13:51

## 2024-01-01 RX ADMIN — CARBIDOPA AND LEVODOPA 1 TABLET(S): 25; 100 TABLET ORAL at 14:25

## 2024-01-01 RX ADMIN — CHLORHEXIDINE GLUCONATE 15 MILLILITER(S): 500 CLOTH TOPICAL at 05:02

## 2024-01-01 RX ADMIN — Medication 255 MILLIGRAM(S): at 14:26

## 2024-01-01 RX ADMIN — Medication 100 MILLIGRAM(S): at 05:28

## 2024-01-01 RX ADMIN — CARBIDOPA AND LEVODOPA 1 TABLET(S): 25; 100 TABLET ORAL at 05:05

## 2024-01-01 RX ADMIN — CHLORHEXIDINE GLUCONATE 1 APPLICATION(S): 500 CLOTH TOPICAL at 05:05

## 2024-01-01 RX ADMIN — HEPARIN SODIUM 5000 UNIT(S): 1000 INJECTION, SOLUTION INTRAVENOUS; SUBCUTANEOUS at 15:58

## 2024-01-01 RX ADMIN — HEPARIN SODIUM 5000 UNIT(S): 1000 INJECTION, SOLUTION INTRAVENOUS; SUBCUTANEOUS at 21:48

## 2024-01-01 RX ADMIN — PIPERACILLIN SODIUM, TAZOBACTAM SODIUM 25 GRAM(S): 3; .375 INJECTION, POWDER, LYOPHILIZED, FOR SOLUTION INTRAVENOUS at 17:54

## 2024-01-01 RX ADMIN — CARBIDOPA AND LEVODOPA 1 TABLET(S): 25; 100 TABLET ORAL at 21:44

## 2024-01-01 RX ADMIN — Medication 100 MEQ/KG/HR: at 08:09

## 2024-01-01 RX ADMIN — PIPERACILLIN SODIUM, TAZOBACTAM SODIUM 25 GRAM(S): 3; .375 INJECTION, POWDER, LYOPHILIZED, FOR SOLUTION INTRAVENOUS at 17:57

## 2024-01-01 RX ADMIN — HEPARIN SODIUM 5000 UNIT(S): 1000 INJECTION, SOLUTION INTRAVENOUS; SUBCUTANEOUS at 23:33

## 2024-01-01 RX ADMIN — CHLORHEXIDINE GLUCONATE 15 MILLILITER(S): 500 CLOTH TOPICAL at 17:18

## 2024-01-01 RX ADMIN — CARBIDOPA AND LEVODOPA 1 TABLET(S): 25; 100 TABLET ORAL at 21:27

## 2024-01-01 RX ADMIN — Medication 100 MILLIGRAM(S): at 23:51

## 2024-01-01 RX ADMIN — CARBIDOPA AND LEVODOPA 1 TABLET(S): 25; 100 TABLET ORAL at 13:01

## 2024-01-01 RX ADMIN — HEPARIN SODIUM 5000 UNIT(S): 1000 INJECTION, SOLUTION INTRAVENOUS; SUBCUTANEOUS at 06:05

## 2024-01-01 RX ADMIN — CARBIDOPA AND LEVODOPA 1 TABLET(S): 25; 100 TABLET ORAL at 21:49

## 2024-01-01 RX ADMIN — CARBIDOPA AND LEVODOPA 1 TABLET(S): 25; 100 TABLET ORAL at 06:05

## 2024-01-01 RX ADMIN — POTASSIUM PHOSPHATE, MONOBASIC AND POTASSIUM PHOSPHATE, DIBASIC 83.33 MILLIMOLE(S): 224; 236 INJECTION, SOLUTION INTRAVENOUS at 06:26

## 2024-01-01 RX ADMIN — PIPERACILLIN SODIUM, TAZOBACTAM SODIUM 25 GRAM(S): 3; .375 INJECTION, POWDER, LYOPHILIZED, FOR SOLUTION INTRAVENOUS at 21:49

## 2024-01-01 RX ADMIN — PANTOPRAZOLE SODIUM 40 MILLIGRAM(S): 20 TABLET, DELAYED RELEASE ORAL at 12:37

## 2024-01-01 RX ADMIN — Medication 1: at 11:55

## 2024-01-01 RX ADMIN — VASOPRESSIN 6 UNIT(S)/MIN: 40 INJECTION INTRAVENOUS at 02:03

## 2024-01-01 RX ADMIN — HEPARIN SODIUM 5000 UNIT(S): 1000 INJECTION, SOLUTION INTRAVENOUS; SUBCUTANEOUS at 21:44

## 2024-01-01 RX ADMIN — CARBIDOPA AND LEVODOPA 1 TABLET(S): 25; 100 TABLET ORAL at 05:28

## 2024-01-01 RX ADMIN — Medication 100 MILLIGRAM(S): at 13:01

## 2024-01-01 RX ADMIN — POTASSIUM CHLORIDE 40 MILLIEQUIVALENT(S): 1500 TABLET, EXTENDED RELEASE ORAL at 01:08

## 2024-01-01 RX ADMIN — Medication 3: at 23:33

## 2024-01-01 RX ADMIN — CHLORHEXIDINE GLUCONATE 1 APPLICATION(S): 500 CLOTH TOPICAL at 05:02

## 2024-01-01 RX ADMIN — Medication 2: at 05:41

## 2024-01-01 RX ADMIN — CHLORHEXIDINE GLUCONATE 15 MILLILITER(S): 500 CLOTH TOPICAL at 13:01

## 2024-01-01 RX ADMIN — PIPERACILLIN SODIUM, TAZOBACTAM SODIUM 25 GRAM(S): 3; .375 INJECTION, POWDER, LYOPHILIZED, FOR SOLUTION INTRAVENOUS at 16:03

## 2024-01-01 RX ADMIN — Medication 1: at 23:38

## 2024-01-01 RX ADMIN — Medication 450 MICROGRAM(S)/KG/MIN: at 19:21

## 2024-01-01 RX ADMIN — Medication 100 MEQ/KG/HR: at 02:18

## 2024-01-01 RX ADMIN — CHLORHEXIDINE GLUCONATE 1 APPLICATION(S): 500 CLOTH TOPICAL at 17:56

## 2024-01-01 RX ADMIN — CHLORHEXIDINE GLUCONATE 1 APPLICATION(S): 500 CLOTH TOPICAL at 06:54

## 2024-01-01 RX ADMIN — Medication 100 MILLIGRAM(S): at 21:42

## 2024-01-01 RX ADMIN — CHLORHEXIDINE GLUCONATE 15 MILLILITER(S): 500 CLOTH TOPICAL at 05:28

## 2024-01-01 RX ADMIN — HEPARIN SODIUM 5000 UNIT(S): 1000 INJECTION, SOLUTION INTRAVENOUS; SUBCUTANEOUS at 21:34

## 2024-01-01 RX ADMIN — CHLORHEXIDINE GLUCONATE 1 APPLICATION(S): 500 CLOTH TOPICAL at 06:07

## 2024-01-01 RX ADMIN — Medication 3.38 MICROGRAM(S)/KG/MIN: at 02:17

## 2024-01-01 RX ADMIN — PIPERACILLIN SODIUM, TAZOBACTAM SODIUM 200 GRAM(S): 3; .375 INJECTION, POWDER, LYOPHILIZED, FOR SOLUTION INTRAVENOUS at 14:01

## 2024-01-01 RX ADMIN — CHLORHEXIDINE GLUCONATE 15 MILLILITER(S): 500 CLOTH TOPICAL at 17:20

## 2024-01-01 RX ADMIN — PIPERACILLIN SODIUM, TAZOBACTAM SODIUM 25 GRAM(S): 3; .375 INJECTION, POWDER, LYOPHILIZED, FOR SOLUTION INTRAVENOUS at 06:05

## 2024-01-01 RX ADMIN — HEPARIN SODIUM 5000 UNIT(S): 1000 INJECTION, SOLUTION INTRAVENOUS; SUBCUTANEOUS at 06:04

## 2024-01-01 RX ADMIN — CHLORHEXIDINE GLUCONATE 1 APPLICATION(S): 500 CLOTH TOPICAL at 05:28

## 2024-01-01 RX ADMIN — HEPARIN SODIUM 5000 UNIT(S): 1000 INJECTION, SOLUTION INTRAVENOUS; SUBCUTANEOUS at 21:27

## 2024-01-01 RX ADMIN — HEPARIN SODIUM 5000 UNIT(S): 1000 INJECTION, SOLUTION INTRAVENOUS; SUBCUTANEOUS at 14:25

## 2024-01-01 RX ADMIN — Medication 100 MILLIGRAM(S): at 14:25

## 2024-01-01 RX ADMIN — Medication 100 MILLIGRAM(S): at 06:05

## 2024-01-01 RX ADMIN — PANTOPRAZOLE SODIUM 40 MILLIGRAM(S): 20 TABLET, DELAYED RELEASE ORAL at 13:01

## 2024-01-01 RX ADMIN — POTASSIUM PHOSPHATE, MONOBASIC AND POTASSIUM PHOSPHATE, DIBASIC 83.33 MILLIMOLE(S): 224; 236 INJECTION, SOLUTION INTRAVENOUS at 19:46

## 2024-01-01 RX ADMIN — Medication 2 MILLIGRAM(S): at 03:55

## 2024-01-01 RX ADMIN — CARBIDOPA AND LEVODOPA 1 TABLET(S): 25; 100 TABLET ORAL at 13:25

## 2024-01-01 RX ADMIN — PIPERACILLIN SODIUM, TAZOBACTAM SODIUM 25 GRAM(S): 3; .375 INJECTION, POWDER, LYOPHILIZED, FOR SOLUTION INTRAVENOUS at 05:02

## 2024-01-01 RX ADMIN — CARBIDOPA AND LEVODOPA 1 TABLET(S): 25; 100 TABLET ORAL at 23:47

## 2024-01-01 RX ADMIN — PANTOPRAZOLE SODIUM 40 MILLIGRAM(S): 20 TABLET, DELAYED RELEASE ORAL at 13:24

## 2024-01-01 RX ADMIN — Medication 1: at 12:37

## 2024-01-01 RX ADMIN — Medication 255 MILLIGRAM(S): at 15:29

## 2024-01-01 RX ADMIN — CHLORHEXIDINE GLUCONATE 15 MILLILITER(S): 500 CLOTH TOPICAL at 17:19

## 2024-01-01 RX ADMIN — Medication 150 MEQ/KG/HR: at 19:21

## 2024-01-01 RX ADMIN — HEPARIN SODIUM 5000 UNIT(S): 1000 INJECTION, SOLUTION INTRAVENOUS; SUBCUTANEOUS at 05:28

## 2024-01-01 RX ADMIN — Medication 100 MILLIGRAM(S): at 15:27

## 2024-01-01 RX ADMIN — HEPARIN SODIUM 5000 UNIT(S): 1000 INJECTION, SOLUTION INTRAVENOUS; SUBCUTANEOUS at 13:01

## 2024-01-01 RX ADMIN — HEPARIN SODIUM 5000 UNIT(S): 1000 INJECTION, SOLUTION INTRAVENOUS; SUBCUTANEOUS at 13:26

## 2024-01-01 RX ADMIN — CHLORHEXIDINE GLUCONATE 15 MILLILITER(S): 500 CLOTH TOPICAL at 06:05

## 2024-01-01 RX ADMIN — HEPARIN SODIUM 5000 UNIT(S): 1000 INJECTION, SOLUTION INTRAVENOUS; SUBCUTANEOUS at 05:05

## 2024-01-01 RX ADMIN — Medication 50 MILLIGRAM(S): at 23:47

## 2024-01-01 RX ADMIN — PIPERACILLIN SODIUM, TAZOBACTAM SODIUM 25 GRAM(S): 3; .375 INJECTION, POWDER, LYOPHILIZED, FOR SOLUTION INTRAVENOUS at 15:29

## 2024-01-01 RX ADMIN — CARBIDOPA AND LEVODOPA 1 TABLET(S): 25; 100 TABLET ORAL at 06:04

## 2024-01-01 RX ADMIN — CARBIDOPA AND LEVODOPA 1 TABLET(S): 25; 100 TABLET ORAL at 15:58

## 2024-01-01 RX ADMIN — Medication 450 MICROGRAM(S)/KG/MIN: at 15:28

## 2024-01-01 RX ADMIN — Medication 3.38 MICROGRAM(S)/KG/MIN: at 08:09

## 2024-01-01 RX ADMIN — HEPARIN SODIUM 5000 UNIT(S): 1000 INJECTION, SOLUTION INTRAVENOUS; SUBCUTANEOUS at 05:02

## 2024-01-01 RX ADMIN — POTASSIUM CHLORIDE 40 MILLIEQUIVALENT(S): 1500 TABLET, EXTENDED RELEASE ORAL at 19:35

## 2024-01-01 RX ADMIN — CHLORHEXIDINE GLUCONATE 15 MILLILITER(S): 500 CLOTH TOPICAL at 05:05

## 2024-01-01 RX ADMIN — CARBIDOPA AND LEVODOPA 1 TABLET(S): 25; 100 TABLET ORAL at 13:26

## 2024-01-01 RX ADMIN — CHLORHEXIDINE GLUCONATE 15 MILLILITER(S): 500 CLOTH TOPICAL at 06:04

## 2024-01-01 RX ADMIN — Medication 50 MILLIGRAM(S): at 06:05

## 2024-01-01 RX ADMIN — HYDRALAZINE HYDROCHLORIDE 10 MILLIGRAM(S): 100 TABLET ORAL at 03:57

## 2024-01-01 RX ADMIN — Medication 100 GRAM(S): at 01:08

## 2024-01-01 RX ADMIN — Medication 100 MILLIGRAM(S): at 21:34

## 2024-01-01 RX ADMIN — CHLORHEXIDINE GLUCONATE 15 MILLILITER(S): 500 CLOTH TOPICAL at 18:16

## 2024-01-01 RX ADMIN — PIPERACILLIN SODIUM, TAZOBACTAM SODIUM 25 GRAM(S): 3; .375 INJECTION, POWDER, LYOPHILIZED, FOR SOLUTION INTRAVENOUS at 22:03

## 2024-01-01 RX ADMIN — FENTANYL CITRATE 3.6 MICROGRAM(S)/KG/HR: 1200 LOZENGE ORAL; TRANSMUCOSAL at 02:03

## 2024-01-01 RX ADMIN — HEPARIN SODIUM 5000 UNIT(S): 1000 INJECTION, SOLUTION INTRAVENOUS; SUBCUTANEOUS at 13:24

## 2024-01-01 RX ADMIN — Medication 2: at 18:16

## 2024-01-01 RX ADMIN — HEPARIN SODIUM 5000 UNIT(S): 1000 INJECTION, SOLUTION INTRAVENOUS; SUBCUTANEOUS at 23:47

## 2024-01-01 RX ADMIN — CARBIDOPA AND LEVODOPA 1 TABLET(S): 25; 100 TABLET ORAL at 05:02

## 2024-01-01 RX ADMIN — CARBIDOPA AND LEVODOPA 1 TABLET(S): 25; 100 TABLET ORAL at 21:33

## 2024-01-01 RX ADMIN — Medication 100 MILLIGRAM(S): at 14:26

## 2024-01-01 RX ADMIN — CHLORHEXIDINE GLUCONATE 15 MILLILITER(S): 500 CLOTH TOPICAL at 13:25

## 2024-01-01 RX ADMIN — PANTOPRAZOLE SODIUM 40 MILLIGRAM(S): 20 TABLET, DELAYED RELEASE ORAL at 18:20

## 2024-01-01 RX ADMIN — CARBIDOPA AND LEVODOPA 1 TABLET(S): 25; 100 TABLET ORAL at 23:32

## 2024-08-02 NOTE — H&P ADULT - NSHPPHYSICALEXAM_GEN_ALL_CORE
PHYSICAL EXAM:    GENERAL: Elderly appearing male laying in stretcher   HEAD:  Atraumatic, Normocephalic  EYES: Pupils dilated non reactive bilaterally, non icteric   ENMT: dry oral mucosa, orally intubated, OGT  NECK: No JVD  NERVOUS SYSTEM:  unresponsive, no pupil reaction, no cough/ gag reflex  CHEST/LUNG: mechanical breath sounds bilaterally, no wheeze  HEART: tachycardic, no m/r/g  ABDOMEN: Soft, Nontender, Nondistended; Bowel sounds present  EXTREMITIES:  2+ Peripheral Pulses, No clubbing, cyanosis, or edema  SKIN: No rashes or lesions

## 2024-08-02 NOTE — ED PROVIDER NOTE - OBJECTIVE STATEMENT
79 year old male with PMH of Parkinson's, HTN, HLD presenting to ED due to cardiac arrest and was found down on ground with no prior discomfort noted. Pt otherwise brought in by EMS with 2 resuscitation attempts. As per EMS pt had 4 epi given then noted ROSC and had PEA rhythm and otherwise then had maintained rosc after intubation in field with 6.5 ETT - and thereafter had cardiac arrest with 2 doses of epi given while en route to ED with sustained ROSC for 20-30 minutes. No other meds given.

## 2024-08-02 NOTE — ED ADULT NURSE NOTE - CHIEF COMPLAINT QUOTE
pt biba from home post arrest +ROSC, found on the floor by family last known well -last night hx  Parkinsons, +pulse on arrival , code blue @2166 in the ED   pt being bagged on arrival intubated

## 2024-08-02 NOTE — H&P ADULT - UNABLE TO OBTAIN
Severe illness/injury Azathioprine Counseling:  I discussed with the patient the risks of azathioprine including but not limited to myelosuppression, immunosuppression, hepatotoxicity, lymphoma, and infections.  The patient understands that monitoring is required including baseline LFTs, Creatinine, possible TPMP genotyping and weekly CBCs for the first month and then every 2 weeks thereafter.  The patient verbalized understanding of the proper use and possible adverse effects of azathioprine.  All of the patient's questions and concerns were addressed.

## 2024-08-02 NOTE — H&P ADULT - CRITICAL CARE ATTENDING COMMENT
Agree with above  79M found down in PEA / asystole. Total downtime unknown  Had three additional episodes of cardiac arrest in ED before consistent ROSC.  Head CT shows signs of early HIE  Currently no gag no pupils but breathing spontaneously.  On epinephrine gtt to maintain MAP >65  Post-ROSC care:  - Incomplete neurological recovery at present, so candidate for guided temperature management       * Head CT with early anoxia       * avoid fevers, general target normothermia       * maintain mag >2       * Continue off sedation; if necessary, can use Precedex or propofol  - stress-dose steroids with hydrocortisone 50mg IV Q6h  - Maintain MAP >70  - Maintain spO2 92-94%, avoid hyperoxia  - Get EEG if any evidence of seizure-like activity  - -180   Had long discussion with entire family. They agree to continue current management for the next 24-48 hours, but if patient shows no meaningful signs of recovery, they will consider transition to comfort care. Patient is now DNR, and if he clinically worsens, they will agree to withdraw care.

## 2024-08-02 NOTE — PROGRESS NOTE ADULT - ASSESSMENT
79 year old male with PHX Parkinsons, HTN, HLD, prostate CA S/P prostatectomy, partial thyroidectomy admitted to the ICU for    # Cardiac Arrest, Multiple PEA, approx total downtime 20-30 min  # Hypoxic ischemic encephalopathy  # Acute Hypoxic Respiratory Failure  # PATRICIA/ATN  # Hypokalemia  # Transaminitis  # Lactic Acidosis Type A    Dispo: DNR    Neuro:  - CTH showing early signs of Anoxic injury  - Pt is not requiring anything for Sedation  - Keep Euthermic    CV:  - Actively titrating EPI to maintain MAP >65  - Stress dose steroids  - Troponin post arrest 86.9, EKG  - TTE ordered    Pulm:  - Low TV lung protective strategies 4-6mL/kg,  - will utilize PEEP for alveolar recruitment is pt desats  - Actively titrating ventilator settings to maintain SpO2 > 92%,  - Daily spontaneous breathing trial if clinical condition warrants  - Vent Bundle in SITU  - End Tidal CO2 monitoring                  GI:  - Cont IV Protonix 40mg IVP Daily,   - Enteral feeds as tolerated to avoid Stress ulceration and optimize nutritional state when indicated    Renal:  - Continue to monitor Bun/Cr and UOP  - Replacing electrolytes as needed with Goal K> 4, PO> 3, Mg> 2               - Strict I&O's, Morales to BSD  - Avoid Nephro toxic medication  - Renally dose meds    Heme:  - heparin for DVT ppx     ID:  - UA negative, Follow up Cultures, Legionella, Mycoplasma, Strep Pneumo  - Ctx/Azithro for broad spectrum coverage  - Microbiology and Radiology reviewed   - trend CBC with diff, CMP  and fever curve    Endo:  - ISS for aggressive glycemic control to limit FS glucose to < 180mg/dl.  - Keep Euthyroid    Critical Care Time (EXCLUSIVE of any non bundled procedures) :  35 minutes were spent assessing the patient's presenting problems of acute illness that pose a high probability of life threatening  deterioration or end organ damage / dysfunction.  Medical desicion making includes initiation / continuation of plan or care review data/ labwork/ radiographic study, direct patient care bedside ,  discussions with  consultants regarding care,  evaluation and interpretation of vital signs, any necessary ventilator management,   NIV or BIPAP changes  or initiation,    discussions with multidisipliary team,  am or pm rounds, discussions of goals of care with patient and family all non-inclusive of procedures.    Date of entry of this note is equal to the date of services rendered   79 year old male with PHX Parkinsons, HTN, HLD, prostate CA S/P prostatectomy, partial thyroidectomy admitted to the ICU for    # Cardiac Arrest, Multiple PEA, approx total downtime 20-30 min  # Hypoxic ischemic encephalopathy  # Acute Hypoxic Respiratory Failure  # PATRICIA/ATN  # Hypokalemia  # Transaminitis  # AGMA  # Lactic Acidosis Type A    Dispo: DNR    Neuro:  - CTH showing early signs of Anoxic injury  - Pt is not requiring anything for Sedation, Pupil fixed and Dilated, No cough, No gag  - Keep Euthermic    CV:  - Actively titrating EPI to maintain MAP >65  - Stress dose steroids  - Troponin post arrest 86.9, EKG No RUEL post cardiac arrest, Troponin 86.9  - TTE ordered  - Bedside POCUS show decreased Systolic function, No pericardial Effusion    Pulm:  - Low TV lung protective strategies 4-6mL/kg,  - will utilize PEEP for alveolar recruitment is pt desats  - Actively titrating ventilator settings to maintain SpO2 > 92%,  - Daily spontaneous breathing trial if clinical condition warrants  - Vent Bundle in SITU  - End Tidal CO2 monitoring                  GI:  - Cont IV Protonix 40mg IVP Daily,   - Enteral feeds as tolerated to avoid Stress ulceration and optimize nutritional state when indicated    Renal:  - Continue to monitor Bun/Cr and UOP  - Replacing electrolytes as needed with Goal K> 4, PO> 3, Mg> 2               - Strict I&O's, Morales to BSD  - Avoid Nephro toxic medication  - Renally dose meds    Heme:  - heparin for DVT ppx     ID:  - UA negative, Follow up Cultures, Legionella, Mycoplasma, Strep Pneumo  - Ctx/Azithro for broad spectrum coverage  - Microbiology and Radiology reviewed   - trend CBC with diff, CMP  and fever curve    Endo:  - ISS for aggressive glycemic control to limit FS glucose to < 180mg/dl.  - Keep Euthyroid, Thyroid Panel ordered    Critical Care Time (EXCLUSIVE of any non bundled procedures) :  35 minutes were spent assessing the patient's presenting problems of acute illness that pose a high probability of life threatening  deterioration or end organ damage / dysfunction.  Medical desicion making includes initiation / continuation of plan or care review data/ labwork/ radiographic study, direct patient care bedside ,  discussions with  consultants regarding care,  evaluation and interpretation of vital signs, any necessary ventilator management,   NIV or BIPAP changes  or initiation,    discussions with multidisipliary team,  am or pm rounds, discussions of goals of care with patient and family all non-inclusive of procedures.    Date of entry of this note is equal to the date of services rendered   79 year old male with PHX Parkinsons, HTN, HLD, prostate CA S/P prostatectomy, partial thyroidectomy admitted to the ICU for    # Cardiac Arrest, Multiple PEA, approx total downtime 20-30 min  # Hypoxic ischemic encephalopathy  # Acute Hypoxic Respiratory Failure  # PATRICIA/ATN  # Hypokalemia  # Transaminitis  # AGMA  # Lactic Acidosis Type A    Dispo: DNR    Neuro:  - CTH showing early signs of Anoxic injury  - Pt is not requiring anything for Sedation, Pupil fixed and Dilated, No cough, No gag  - Keep Euthermic    CV:  - Actively titrating EPI to maintain MAP >65  - Stress dose steroids  - Troponin post arrest 86.9, EKG No RUEL post cardiac arrest, Troponin 86.9  - TTE ordered  - Bedside POCUS show decreased Systolic function, No pericardial Effusion    Pulm:  - Low TV lung protective strategies 4-6mL/kg,  - will utilize PEEP for alveolar recruitment is pt desats  - Actively titrating ventilator settings to maintain SpO2 > 92%,  - Daily spontaneous breathing trial if clinical condition warrants  - Vent Bundle in SITU  - End Tidal CO2 monitoring                  GI:  - Cont IV Protonix 40mg IVP Daily,   - Enteral feeds as tolerated to avoid Stress ulceration and optimize nutritional state when indicated    Renal:  - Continue to monitor Bun/Cr and UOP  - Replacing electrolytes as needed with Goal K> 4, PO> 3, Mg> 2               - Strict I&O's, Morales to BSD  - Avoid Nephro toxic medication  - Renally dose meds  - s/p 1 bag HCO3    Heme:  - heparin for DVT ppx     ID:  - UA negative, Follow up Cultures, Legionella, Mycoplasma, Strep Pneumo  - Ctx/Azithro for broad spectrum coverage  - Microbiology and Radiology reviewed   - trend CBC with diff, CMP  and fever curve    Endo:  - ISS for aggressive glycemic control to limit FS glucose to < 180mg/dl.  - Keep Euthyroid, Thyroid Panel ordered    Critical Care Time (EXCLUSIVE of any non bundled procedures) :  35 minutes were spent assessing the patient's presenting problems of acute illness that pose a high probability of life threatening  deterioration or end organ damage / dysfunction.  Medical desicion making includes initiation / continuation of plan or care review data/ labwork/ radiographic study, direct patient care bedside ,  discussions with  consultants regarding care,  evaluation and interpretation of vital signs, any necessary ventilator management,   NIV or BIPAP changes  or initiation,    discussions with multidisipliary team,  am or pm rounds, discussions of goals of care with patient and family all non-inclusive of procedures.    Date of entry of this note is equal to the date of services rendered   79 year old male with PHX Parkinsons, HTN, HLD, prostate CA S/P prostatectomy, partial thyroidectomy admitted to the ICU for    # Cardiac Arrest, Multiple PEA, approx total downtime 20-30 min  # Hypoxic ischemic encephalopathy  # Acute Hypoxic Respiratory Failure  # PATRICIA/ATN  # Hypokalemia  # Transaminitis  # AGMA  # Lactic Acidosis Type A    Dispo: DNR    Neuro:  - CTH showing early signs of Anoxic injury  - Pt is not requiring anything for Sedation, Pupil fixed and Dilated, No cough, No gag  - Keep Euthermic    CV:  - Actively titrating EPI to maintain MAP >65  - Stress dose steroids  - Troponin post arrest 86.9, EKG No RUEL post cardiac arrest  - TTE ordered  - Bedside POCUS show decreased Systolic function, No pericardial Effusion    Pulm:  - Low TV lung protective strategies 4-6mL/kg,  - will utilize PEEP for alveolar recruitment is pt desats  - Actively titrating ventilator settings to maintain SpO2 > 92%,  - Daily spontaneous breathing trial if clinical condition warrants  - Vent Bundle in SITU  - End Tidal CO2 monitoring                  GI:  - Cont IV Protonix 40mg IVP Daily,   - Enteral feeds as tolerated to avoid Stress ulceration and optimize nutritional state when indicated    Renal:  - Continue to monitor Bun/Cr and UOP  - Replacing electrolytes as needed with Goal K> 4, PO> 3, Mg> 2               - Strict I&O's, Morales to BSD  - Avoid Nephro toxic medication  - Renally dose meds  - s/p 1 bag HCO3    Heme:  - heparin for DVT ppx     ID:  - UA negative, Follow up Cultures, Legionella, Mycoplasma, Strep Pneumo  - Ctx/Azithro for broad spectrum coverage  - Microbiology and Radiology reviewed   - trend CBC with diff, CMP  and fever curve    Endo:  - ISS for aggressive glycemic control to limit FS glucose to < 180mg/dl.  - Keep Euthyroid, Thyroid Panel ordered    Critical Care Time (EXCLUSIVE of any non bundled procedures) :  35 minutes were spent assessing the patient's presenting problems of acute illness that pose a high probability of life threatening  deterioration or end organ damage / dysfunction.  Medical desicion making includes initiation / continuation of plan or care review data/ labwork/ radiographic study, direct patient care bedside ,  discussions with  consultants regarding care,  evaluation and interpretation of vital signs, any necessary ventilator management,   NIV or BIPAP changes  or initiation,    discussions with multidisipliary team,  am or pm rounds, discussions of goals of care with patient and family all non-inclusive of procedures.    Date of entry of this note is equal to the date of services rendered

## 2024-08-02 NOTE — ED PROVIDER NOTE - CRITICAL CARE ATTENDING CONTRIBUTION TO CARE
pt with cardiac arrest  x 4 and resuscitated  and otherwise will admit to ICU for further care and evaluation. pt with cardiac arrest  x 4 and resuscitated  in ED given calcium, magnesium and bicarb and then epi and ROSC noted otherwise will admit to ICU for further care and evaluation.

## 2024-08-02 NOTE — H&P ADULT - ASSESSMENT
79 year old male with PHX Parkinsons, HTN, HLD, prostate CA S/P prostatectomy, partial thyroidectomy brought in by EMS after out of hospital cardiac arrest (PEA Asystole) intially received ROSC after approximately 15 minutes given epi x 4, intubated with EMS and ROSC obtained. On arrival to ED subsequently had additional 3 cardiac arrests all PEA/asystole for an additional estimated down time of 10-15 minutes for a total of 20-30 minutes. Unclear etiology of cardiac arrest. After cardiac arrest requiring epinephrine gtt. Evaluated and admitted to ICU for post cardiac arrest mgmt.     PLAN:    #Neuro:  - No gag, no cough, pupils dilated and non reactive, no response to physical stimuli  - CTH pending  - Currently not on sedation to attempt to monitor MS  - fever control,  avoid metabolic derangements, and maintain adequate MAP to defend cerebral perfusion post arrest    #Resp:  -intubated for cardiac arrest  -currently satting well on current vent settings: 24/450/100/5. Will follow ABG PRN and titrate  - maintain SpO2>92%     #CV:  - PEA arrest with approx 20-30 minute downtime, unclear etiology  - post arrest EKG without ST elevations  -  likely cardiogenic shock S/P cardiac arrest, Currently on Epinephrine gtt   - titrate pressors for MAP>65  - Will POCUS on arrival to ICU, f/u official TTE  - troponin minimally elevated likely 2/2 demand ischemia from arrest + CPR. EKG without ST elevations. Will trend troponin and EKG.     #GI:  - npo for now while on high dose vasopressors  -abd soft and nondistended  - GI ppx: Protonix    #Renal:  - PATRICIA likely ATN from cardiac arrest  -cont to trend BUN/CR, UO, electrolytes  -lieberman for strict I/Os  -replace lytes as necessary  -lactic acidosis likely from cardiac arrest and epinephrine will continue to trend     #ID:  -afebrile with minimal leukocytosis will send blood cultures and give empiric CAP coverage with Ceftriaxone and Azithro   - Monitor fever curve, keep afebrile to decrease metabolic demand    #Heme:  -cbc stable   - Holding off DVT pxp until head CT results, if no ICH will place on heparin SQ for dvt ppx    #Endo:   -maintain FS<180 with ISS    #Ethics:  - Full code for now, Condition extremely guarded.   - Discussed with Daughter and Grandson at bedside multiple cardiac arrests with likely associated anoxic inury. Discussed that further attempts at CPR should patient experience a fifth cardiac arrest would likely be futile and result in overall poor quality of remaining life. Family verbalized understanding and said they needed to speak to other family members at this time.   - Plan discussed with ICU attending MD Dias.

## 2024-08-02 NOTE — H&P ADULT - HISTORY OF PRESENT ILLNESS
79 year old male with PHX Parkinsons, HTN, HLD, prostate CA S/P prostatectomy, partial thyroidectomy brought in by EMS after out of hospital cardiac arrests. Daughter at bedside states was in usual state of health earlier today and then started complaining of feeling unwell, shortly after found him unresponsive laying on floor and called EMS. EMS reported PEA/asystole arrest given epi x 4 with ROSC. On arrival to ED patient with second cardiac arrest also PEA/asystole lasting approx 4-5 minutes with epi x 2 and ROSC. Followed by 2 additional PEA/asystole cardiac arrest each time given epi x 1 with CPR and ROSC. Placed on Epinephrine gtt after. Family denies any other symptoms leading up to arrival.

## 2024-08-02 NOTE — PROCEDURE NOTE - NSINFORMCONSENT_GEN_A_CORE
This was an emergent procedure.
innaccurate NIBP readings on high dose Epi drip/This was an emergent procedure.
4

## 2024-08-02 NOTE — ED ADULT TRIAGE NOTE - CHIEF COMPLAINT QUOTE
pt biba from home post arrest +ROSC, found on the floor by family last known well -last night hx  Parkinsons, +pulse on arrival , code blue @1895 in the ED pt biba from home post arrest +ROSC, found on the floor by family last known well -last night hx  Parkinsons, +pulse on arrival , code blue @1702 in the ED   pt being bagged on arrival intubated

## 2024-08-02 NOTE — ED ADULT NURSE NOTE - OBJECTIVE STATEMENT
pt biba from home post arrest +ROSC, found on the floor by family last known well -last night hx  Parkinsons, +pulse on arrival , code blue @133 in the ED. Dr Jean at bedside ACLS protocol initiated. See flow sheet for details. Pt has left leg IO done in field and was intubated in field 6.5 and 21 at lip, adjusted while here to 23. Unknown down time

## 2024-08-02 NOTE — H&P ADULT - NSHPLABSRESULTS_GEN_ALL_CORE
LABS:                        11.5   10.71 )-----------( 180      ( 02 Aug 2024 14:00 )             38.2     08-02    142  |  110<H>  |  16  ----------------------------<  267<H>  4.0   |  13<L>  |  1.96<H>    Ca    10.1      02 Aug 2024 14:00    TPro  6.2  /  Alb  2.8<L>  /  TBili  0.6  /  DBili  x   /  AST  225<H>  /  ALT  41  /  AlkPhos  88  08-02    PT/INR - ( 02 Aug 2024 14:00 )   PT: 12.1 sec;   INR: 1.02 ratio         PTT - ( 02 Aug 2024 14:00 )  PTT:45.3 sec      Blood Gas Profile - Arterial (08.02.24 @ 13:59)    pH, Arterial: 6.96    pCO2, Arterial: 75 mmHg    pO2, Arterial: 212 mmHg    HCO3, Arterial: 17 mmol/L    Base Excess, Arterial: -16.1 mmol/L    Oxygen Saturation, Arterial: 99.9 %    Total CO2, Arterial: 19 mmol/L    FIO2, Arterial: 100.0    Blood Gas Comments Arterial: AC/VC/16/450/100%/5, right radial puncture, modified Juan F's test  positive site held until bleeding stopped, no hematoma noted.    Blood Gas Source Arterial: Arterial    RADIOLOGY: Head CT pending

## 2024-08-02 NOTE — PROGRESS NOTE ADULT - SUBJECTIVE AND OBJECTIVE BOX
Patient is a 79y old  Male who presents with a chief complaint of Cardiac arrest (02 Aug 2024 14:56)      BRIEF HOSPITAL COURSE: 79 year old male with PHX Parkinsons, HTN, HLD, prostate CA S/P prostatectomy, partial thyroidectomy brought in by EMS after out of hospital cardiac arrests. Daughter at bedside states was in usual state of health earlier today and then started complaining of feeling unwell, shortly after found him unresponsive laying on floor and called EMS. EMS reported PEA/asystole arrest given epi x 4 with ROSC. On arrival to ED patient with second cardiac arrest also PEA/asystole lasting approx 4-5 minutes with epi x 2 and ROSC. Followed by 2 additional PEA/asystole cardiac arrest each time given epi x 1 with CPR and ROSC. Placed on Epinephrine gtt after. Family denies any other symptoms leading up to arrival.     Events last 24 hours: Full mechanical ventilation with vasopressor support. No improvement in mental status    PAST MEDICAL & SURGICAL HISTORY:      Review of Systems:  NIKKI due to clinical condition    Medications:  azithromycin  IVPB 500 milliGRAM(s) IV Intermittent every 24 hours    EPINEPHrine    Infusion 2 MICROgram(s)/kG/Min IV Continuous <Continuous>      carbidopa/levodopa  25/100 1 Tablet(s) Oral three times a day      heparin   Injectable 5000 Unit(s) SubCutaneous every 8 hours    pantoprazole  Injectable 40 milliGRAM(s) IV Push daily      hydrocortisone sodium succinate Injectable 100 milliGRAM(s) IV Push every 8 hours  insulin lispro (ADMELOG) corrective regimen sliding scale   SubCutaneous every 6 hours        chlorhexidine 0.12% Liquid 15 milliLiter(s) Oral Mucosa every 12 hours  chlorhexidine 2% Cloths 1 Application(s) Topical <User Schedule>        Mode: AC/ CMV (Assist Control/ Continuous Mandatory Ventilation)  RR (machine): 18  TV (machine): 450  FiO2: 70  PEEP: 5  ITime: 0.9  MAP: 9  PIP: 18      ICU Vital Signs Last 24 Hrs  T(C): 35 (02 Aug 2024 19:10), Max: 36.6 (02 Aug 2024 13:58)  T(F): 95 (02 Aug 2024 19:10), Max: 97.8 (02 Aug 2024 13:58)  HR: 59 (02 Aug 2024 20:21) (0 - 126)  BP: 142/57 (02 Aug 2024 17:30) (0/0 - 173/104)  BP(mean): 81 (02 Aug 2024 17:30) (81 - 99)  ABP: 141/57 (02 Aug 2024 20:00) (141/57 - 161/61)  ABP(mean): 82 (02 Aug 2024 20:00) (82 - 92)  RR: 21 (02 Aug 2024 20:00) (0 - 24)  SpO2: 99% (02 Aug 2024 20:21) (95% - 100%)    O2 Parameters below as of 02 Aug 2024 19:10  Patient On (Oxygen Delivery Method): ventilator, pressure support 5/5 70%            ABG - ( 02 Aug 2024 18:17 )  pH, Arterial: 7.18  pH, Blood: x     /  pCO2: 35    /  pO2: 221   / HCO3: 13    / Base Excess: -14.3 /  SaO2: 100.0               I&O's Detail    02 Aug 2024 07:01  -  02 Aug 2024 21:18  --------------------------------------------------------  IN:    EPINEPHrine: 315 mL  Total IN: 315 mL    OUT:    Stool (mL): 1 mL    Voided (mL): 220 mL  Total OUT: 221 mL    Total NET: 94 mL          LABS:                        12.8   22.58 )-----------( 216      ( 02 Aug 2024 18:20 )             40.4     08-02    143  |  112<H>  |  21  ----------------------------<  227<H>  2.7<LL>   |  13<L>  |  2.21<H>    Ca    8.8      02 Aug 2024 18:20  Phos  4.2     08-02  Mg     2.6     08-02    TPro  7.0  /  Alb  3.3  /  TBili  0.4  /  DBili  x   /  AST  360<H>  /  ALT  152<H>  /  AlkPhos  139<H>  08-02          CAPILLARY BLOOD GLUCOSE      POCT Blood Glucose.: 213 mg/dL (02 Aug 2024 18:10)    PT/INR - ( 02 Aug 2024 14:00 )   PT: 12.1 sec;   INR: 1.02 ratio         PTT - ( 02 Aug 2024 14:00 )  PTT:45.3 sec  Urinalysis Basic - ( 02 Aug 2024 18:20 )    Color: x / Appearance: x / SG: x / pH: x  Gluc: 227 mg/dL / Ketone: x  / Bili: x / Urobili: x   Blood: x / Protein: x / Nitrite: x   Leuk Esterase: x / RBC: x / WBC x   Sq Epi: x / Non Sq Epi: x / Bacteria: x      CULTURES:      Physical Examination:    General: Ill Appearing     HEENT: Pupils equal, reactive to light. Symmetric. No scleral icterus or injection.    PULM: Clear to auscultation B/L. No wheezes, rales, or rhonchi apprecaited. No significant sputum production or increased respiratory effort.    NECK: Supple, no lymphadenopathy, trachea midline.    CVS: Regular rate and rhythm, no murmurs appreciated, +s1/s2.    ABD: Soft, nondistended, nontender, normoactive bowel sounds.    EXT: No edema, nontender.    SKIN: Warm and well perfused, no rashes noted.    NEURO: No mental status, Pupils fixed and dilated, No cough or Gag    RADIOLOGY: < from: CT Head No Cont (08.02.24 @ 15:04) >    ACC: [id number] EXAM:  CT BRAIN   ORDERED BY: Rowena Jean     PROCEDURE DATE:  08/02/2024          INTERPRETATION:  CLINICAL INFORMATION: Status post arrest, found on   ground.    COMPARISON: None.    CONTRAST:  IV Contrast: None  Complications: None reported at time of study completion    TECHNIQUE:  Serial axial images were obtained from the skull base to the   vertex using multi-slice helical technique. Sagittal and coronal   reformats were obtained.    FINDINGS:    VENTRICLES AND SULCI: Age appropriate involutional changes.  INTRA-AXIAL: Decrease discrimination of the cortical gray matter and   insular ribbons. Faint definition of the deep gray matter structures. No   mass effect, acute hemorrhage, or midline shift.  There are   periventricular and subcortical white matter hypodensities, consistent   with microvascular type changes.  EXTRA-AXIAL: No mass or fluid collection. Basal cisterns are normal in   appearance.    VISUALIZED SINUSES:  Scattered mucosal thickening.  TYMPANOMASTOID CAVITIES:  Clear.  VISUALIZED ORBITS: The orbits are mildly proptotic  CALVARIUM: Intact.    MISCELLANEOUS: Fluid level in the posterior nasopharynx.      IMPRESSION:    Decrease discrimination of the cortical gray matter and insular ribbons   with only faint definition of the deep gray matter structures. 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 Recommend MRI of the brain for further evaluation.    This report was discussed with Dr. Miranda MD at 8/2/2024 3:36 PM via Dr. Crowell.        --- End of Report ---          Whitley Crowell DO  This document has been electronically signed.  Delroy Barnes MD    < end of copied text >

## 2024-08-03 NOTE — PROGRESS NOTE ADULT - ASSESSMENT
80 y/o M w/parkinson's and hx of prostate CA presenting following multiple PEA arrests. Acute respiratory failure with hypoxia and hypercapnia secondary to arrest. Likely severe anoxic brain injury. Hypotension likely post arrest vs severe sepsis with septic shock. PATRICIA likely ATN.     - Targeted temperature management  - Continue mechanical ventilation  - Titrate pressors as needed goal MAP >= 65  - Abx, follow up cultures  - Trend Cr, avoid nephrotoxins  - DVT prophylaxis  - DNR/DNI    I have personally provided 35 minutes of attending critical care time excluding procedures.

## 2024-08-03 NOTE — PROGRESS NOTE ADULT - SUBJECTIVE AND OBJECTIVE BOX
HPI:  79 year old male with PHX Parkinsons, HTN, HLD, prostate CA S/P prostatectomy, partial thyroidectomy brought in by EMS after out of hospital cardiac arrests. Daughter at bedside states was in usual state of health earlier today and then started complaining of feeling unwell, shortly after found him unresponsive laying on floor and called EMS. EMS reported PEA/asystole arrest given epi x 4 with ROSC. On arrival to ED patient with second cardiac arrest also PEA/asystole lasting approx 4-5 minutes with epi x 2 and ROSC. Followed by 2 additional PEA/asystole cardiac arrest each time given epi x 1 with CPR and ROSC. Placed on Epinephrine gtt after. Family denies any other symptoms leading up to arrival.  (02 Aug 2024 14:56)      24 hr events: Had multiple cardiac arrests. Intubated. Admitted to ICU    ## ROS:  [x] unable to obtain    ## Vitals  ICU Vital Signs Last 24 Hrs  T(C): 36 (03 Aug 2024 06:00), Max: 36.6 (02 Aug 2024 13:58)  T(F): 96.8 (03 Aug 2024 06:00), Max: 97.8 (02 Aug 2024 13:58)  HR: 59 (03 Aug 2024 07:59) (0 - 126)  BP: 126/72 (03 Aug 2024 02:30) (0/0 - 173/104)  BP(mean): 87 (03 Aug 2024 02:30) (81 - 99)  ABP: 112/98 (03 Aug 2024 06:00) (100/88 - 161/61)  ABP(mean): 102 (03 Aug 2024 06:00) (80 - 106)  RR: 26 (03 Aug 2024 06:00) (0 - 29)  SpO2: 100% (03 Aug 2024 07:59) (95% - 100%)    O2 Parameters below as of 02 Aug 2024 19:10  Patient On (Oxygen Delivery Method): ventilator, pressure support 5/5 70%            ## Physical Exam:  Gen: Elderly male lying in bed, intubated, NAD  HEENT: NC/AT sclerae anicteric  Resp: Mechanical breath sounds b/l, overbreathing vent  CV: S1, S2  Abd: Soft, + BS  Ext: WWP  Neuro: Unarousable, absent pupillary and GAG reflexes    ## Vent Data  Mode: AC/ CMV (Assist Control/ Continuous Mandatory Ventilation)  RR (machine): 24  TV (machine): 450  FiO2: 30  PEEP: 5  ITime: 1  MAP: 14  PIP: 31      ## Labs:  Chem:  08-03    143  |  110<H>  |  27<H>  ----------------------------<  292<H>  3.8   |  19<L>  |  2.44<H>    Ca    8.0<L>      03 Aug 2024 04:40  Phos  3.4     08-03  Mg     2.3     08-03    TPro  6.1  /  Alb  2.7<L>  /  TBili  0.4  /  DBili  x   /  AST  303<H>  /  ALT  158<H>  /  AlkPhos  83  08-03    LIVER FUNCTIONS - ( 03 Aug 2024 04:40 )  Alb: 2.7 g/dL / Pro: 6.1 gm/dL / ALK PHOS: 83 U/L / ALT: 158 U/L / AST: 303 U/L / GGT: x           CBC:                        11.9   22.54 )-----------( 182      ( 03 Aug 2024 04:40 )             36.4     Coags:  PT/INR - ( 02 Aug 2024 14:00 )   PT: 12.1 sec;   INR: 1.02 ratio         PTT - ( 02 Aug 2024 14:00 )  PTT:45.3 sec    Urinalysis Basic - ( 03 Aug 2024 04:40 )    Color: x / Appearance: x / SG: x / pH: x  Gluc: 292 mg/dL / Ketone: x  / Bili: x / Urobili: x   Blood: x / Protein: x / Nitrite: x   Leuk Esterase: x / RBC: x / WBC x   Sq Epi: x / Non Sq Epi: x / Bacteria: x        ## Cardiac        ## Blood Gas  ABG - ( 02 Aug 2024 18:17 )  pH, Arterial: 7.18  pH, Blood: x     /  pCO2: 35    /  pO2: 221   / HCO3: 13    / Base Excess: -14.3 /  SaO2: 100.0               #I/Os  I&O's Detail    02 Aug 2024 07:01  -  03 Aug 2024 07:00  --------------------------------------------------------  IN:    EPINEPHrine: 514.8 mL    FentaNYL: 43.2 mL    Norepinephrine: 8.4 mL    Sodium Bicarbonate: 900 mL    Sodium Bicarbonate: 700 mL    Vasopressin: 6 mL  Total IN: 2172.4 mL    OUT:    Indwelling Catheter - Urethral (mL): 665 mL    Stool (mL): 1 mL    Voided (mL): 220 mL  Total OUT: 886 mL    Total NET: 1286.4 mL          ## Imaging:    ## Medications:  MEDICATIONS  (STANDING):  azithromycin  IVPB 500 milliGRAM(s) IV Intermittent every 24 hours  carbidopa/levodopa  25/100 1 Tablet(s) Oral three times a day  cefTRIAXone   IVPB 1000 milliGRAM(s) IV Intermittent every 24 hours  chlorhexidine 0.12% Liquid 15 milliLiter(s) Oral Mucosa every 12 hours  chlorhexidine 2% Cloths 1 Application(s) Topical <User Schedule>  heparin   Injectable 5000 Unit(s) SubCutaneous every 8 hours  hydrocortisone sodium succinate Injectable 100 milliGRAM(s) IV Push every 8 hours  insulin lispro (ADMELOG) corrective regimen sliding scale   SubCutaneous every 6 hours  norepinephrine Infusion 0.05 MICROgram(s)/kG/Min (3.38 mL/Hr) IV Continuous <Continuous>  pantoprazole  Injectable 40 milliGRAM(s) IV Push daily  sodium bicarbonate  Infusion 0.208 mEq/kG/Hr (100 mL/Hr) IV Continuous <Continuous>    MEDICATIONS  (PRN):

## 2024-08-04 NOTE — PROGRESS NOTE ADULT - ASSESSMENT
78 yo m pmhx parkinson's, HTN, HLD, prostate ca s/p prostatectomy, partial thyroidectomy admitted s/p OOH cardiac arrest, shock, hypoxic respiratory failure, MARLEY, transaminitis, MA and anoxic brain injury.     NEURO: off sedation, poor mentation/exam.  Anoxic injury.    CV: hypertensive this evening, ordered for IV hydralazine.    RESP: ac/vc 4-6 cc/kg tv lung protective strategies, titrating settings for spo2 >92%  RENAL: Marley, avoid nephrotoxic meds, renally dose meds, trend urine output, bun/cr and electrolytes. replace lytes. lieberman  GI: NPO  ENDO: ISS for glycemic control   ID: [email or web address]   HEME: Heparin for vte ppx   DISPO: DNR/I      DATE OF DOCUMENTATION EQUIVALENT TO DATE OF SERVICES RENDERED

## 2024-08-04 NOTE — PROGRESS NOTE ADULT - SUBJECTIVE AND OBJECTIVE BOX
HPI:  79 year old male with PHX Parkinsons, HTN, HLD, prostate CA S/P prostatectomy, partial thyroidectomy brought in by EMS after out of hospital cardiac arrests. Daughter at bedside states was in usual state of health earlier today and then started complaining of feeling unwell, shortly after found him unresponsive laying on floor and called EMS. EMS reported PEA/asystole arrest given epi x 4 with ROSC. On arrival to ED patient with second cardiac arrest also PEA/asystole lasting approx 4-5 minutes with epi x 2 and ROSC. Followed by 2 additional PEA/asystole cardiac arrest each time given epi x 1 with CPR and ROSC. Placed on Epinephrine gtt after. Family denies any other symptoms leading up to arrival.  (02 Aug 2024 14:56)      24 hr events: No acute events. Remains intubated and unresponsive.     ## ROS:  [x ] unable to obtain    ## Vitals  ICU Vital Signs Last 24 Hrs  T(C): 38 (04 Aug 2024 02:00), Max: 38 (04 Aug 2024 02:00)  T(F): 100.4 (04 Aug 2024 02:00), Max: 100.4 (04 Aug 2024 02:00)  HR: 64 (04 Aug 2024 07:40) (59 - 78)  BP: 138/79 (04 Aug 2024 02:00) (138/79 - 138/79)  BP(mean): 96 (04 Aug 2024 02:00) (96 - 96)  ABP: 152/101 (04 Aug 2024 07:40) (111/55 - 174/80)  ABP(mean): 119 (04 Aug 2024 07:40) (73 - 122)  RR: 19 (04 Aug 2024 07:40) (12 - 26)  SpO2: 100% (04 Aug 2024 07:40) (99% - 100%)        ## Physical Exam:  Gen: Elderly male lying in bed, intubated, NAD  HEENT: NC/AT sclerae anicteric  Resp: Mechanical breath sounds b/l, overbreathing vent  CV: S1, S2  Abd: Soft, + BS  Ext: WWP  Neuro: Unarousable, absent pupillary and GAG reflexes      ## Vent Data  Mode: AC/ CMV (Assist Control/ Continuous Mandatory Ventilation)  RR (machine): 18  TV (machine): 450  FiO2: 35  PEEP: 5  ITime: 0.7  MAP: 10  PIP: 29      ## Labs:  Chem:  08-04    144  |  109<H>  |  42<H>  ----------------------------<  146<H>  4.6   |  26  |  2.99<H>    Ca    7.2<L>      04 Aug 2024 02:20  Phos  3.6     08-04  Mg     2.2     08-04    TPro  6.4  /  Alb  2.8<L>  /  TBili  0.7  /  DBili  x   /  AST  207<H>  /  ALT  83<H>  /  AlkPhos  84  08-04    LIVER FUNCTIONS - ( 04 Aug 2024 02:20 )  Alb: 2.8 g/dL / Pro: 6.4 gm/dL / ALK PHOS: 84 U/L / ALT: 83 U/L / AST: 207 U/L / GGT: x           CBC:                        10.8   21.21 )-----------( 163      ( 04 Aug 2024 02:20 )             33.2     Coags:  PT/INR - ( 02 Aug 2024 14:00 )   PT: 12.1 sec;   INR: 1.02 ratio         PTT - ( 02 Aug 2024 14:00 )  PTT:45.3 sec    Urinalysis Basic - ( 04 Aug 2024 02:20 )    Color: x / Appearance: x / SG: x / pH: x  Gluc: 146 mg/dL / Ketone: x  / Bili: x / Urobili: x   Blood: x / Protein: x / Nitrite: x   Leuk Esterase: x / RBC: x / WBC x   Sq Epi: x / Non Sq Epi: x / Bacteria: x        ## Cardiac        ## Blood Gas  ABG - ( 03 Aug 2024 08:15 )  pH, Arterial: 7.46  pH, Blood: x     /  pCO2: 34    /  pO2: 226   / HCO3: 24    / Base Excess: 0.8   /  SaO2: 99.3                #I/Os  I&O's Detail    03 Aug 2024 07:01  -  04 Aug 2024 07:00  --------------------------------------------------------  IN:    IV PiggyBack: 300 mL    Norepinephrine: 1.4 mL    Sodium Bicarbonate: 500 mL  Total IN: 801.4 mL    OUT:    Indwelling Catheter - Urethral (mL): 780 mL    Stool (mL): 1 mL  Total OUT: 781 mL    Total NET: 20.4 mL          ## Imaging:    ## Medications:  MEDICATIONS  (STANDING):  carbidopa/levodopa  25/100 1 Tablet(s) Oral three times a day  cefTRIAXone   IVPB 1000 milliGRAM(s) IV Intermittent every 24 hours  chlorhexidine 0.12% Liquid 15 milliLiter(s) Oral Mucosa every 12 hours  chlorhexidine 2% Cloths 1 Application(s) Topical <User Schedule>  heparin   Injectable 5000 Unit(s) SubCutaneous every 8 hours  hydrocortisone sodium succinate Injectable 100 milliGRAM(s) IV Push every 8 hours  insulin lispro (ADMELOG) corrective regimen sliding scale   SubCutaneous every 6 hours  pantoprazole  Injectable 40 milliGRAM(s) IV Push daily    MEDICATIONS  (PRN):

## 2024-08-04 NOTE — PROGRESS NOTE ADULT - ASSESSMENT
78 y/o M w/parkinson's and hx of prostate CA presenting following multiple PEA arrests. Acute respiratory failure with hypoxia and hypercapnia secondary to arrest. Likely severe anoxic brain injury. Hypotension likely post arrest vs severe sepsis with septic shock. PATRICIA likely ATN.     - Continue mechanical ventilation  - Titrate pressors as needed goal MAP >= 65  - Abx, follow up cultures  - Trend Cr, avoid nephrotoxins  - DVT prophylaxis  - DNR/DNI    I have personally provided 35 minutes of attending critical care time excluding procedures.

## 2024-08-04 NOTE — CHART NOTE - NSCHARTNOTEFT_GEN_A_CORE
Patient observed to convert into VTACH with pulse. Synchronized cardioverted x 2 with no conversion out of VTACH. Will give Amiodarone 150mg Loading dose and re-evaluate. Patient remains on Transvenous pacing with vasopressor support. MD Sanders at bedside. Condition remains extremely guarded.

## 2024-08-04 NOTE — PROGRESS NOTE ADULT - SUBJECTIVE AND OBJECTIVE BOX
Patient is a 79y old  Male who presents with a chief complaint of Cardiac arrest (03 Aug 2024 08:03)      BRIEF HOSPITAL COURSE:       Events last 24 hours:   hypertensive this am, ordered for hydralazine ivp.        PAST MEDICAL & SURGICAL HISTORY:      Allergies  No Known Allergies      FAMILY HISTORY:  unknown      Social History:   from home      Review of Systems:  unable to obtain 2/2 ms      Physical Examination:    General: elderly male, lying in bed    HEENT: ett and ogt in place    PULM: grossly cta b/l    CVS: rrr    ABD: Soft, nondistended, nontender, +bs    EXT: + edema, nontender    SKIN: Warm     NEURO: unresponsive, pupils fixed and dilated. does not withdrawal to pain. intermittently over breathing vent      Medications:  cefTRIAXone   IVPB 1000 milliGRAM(s) IV Intermittent every 24 hours  carbidopa/levodopa  25/100 1 Tablet(s) Oral three times a day  heparin   Injectable 5000 Unit(s) SubCutaneous every 8 hours  pantoprazole  Injectable 40 milliGRAM(s) IV Push daily  hydrocortisone sodium succinate Injectable 100 milliGRAM(s) IV Push every 8 hours  insulin lispro (ADMELOG) corrective regimen sliding scale   SubCutaneous every 6 hours  chlorhexidine 0.12% Liquid 15 milliLiter(s) Oral Mucosa every 12 hours  chlorhexidine 2% Cloths 1 Application(s) Topical <User Schedule>      Mode: AC/ CMV (Assist Control/ Continuous Mandatory Ventilation)  RR (machine): 18  TV (machine): 450  FiO2: 35  PEEP: 5  ITime: 0.7  MAP: 9  PIP: 28      ICU Vital Signs Last 24 Hrs  T(C): 38 (04 Aug 2024 02:00), Max: 38 (04 Aug 2024 02:00)  T(F): 100.4 (04 Aug 2024 02:00), Max: 100.4 (04 Aug 2024 02:00)  HR: 73 (04 Aug 2024 03:00) (49 - 78)  BP: 138/79 (04 Aug 2024 02:00) (138/79 - 138/79)  BP(mean): 96 (04 Aug 2024 02:00) (96 - 96)  ABP: 174/80 (04 Aug 2024 03:00) (100/53 - 174/80)  ABP(mean): 114 (04 Aug 2024 03:00) (68 - 114)  RR: 19 (04 Aug 2024 03:00) (12 - 29)  SpO2: 100% (04 Aug 2024 03:00) (99% - 100%)    O2 Parameters below as of 03 Aug 2024 08:30  Patient On (Oxygen Delivery Method): ventilator, RR18    O2 Concentration (%): 35      Vital Signs Last 24 Hrs  T(C): 38 (04 Aug 2024 02:00), Max: 38 (04 Aug 2024 02:00)  T(F): 100.4 (04 Aug 2024 02:00), Max: 100.4 (04 Aug 2024 02:00)  HR: 73 (04 Aug 2024 03:00) (49 - 78)  BP: 138/79 (04 Aug 2024 02:00) (138/79 - 138/79)  BP(mean): 96 (04 Aug 2024 02:00) (96 - 96)  RR: 19 (04 Aug 2024 03:00) (12 - 29)  SpO2: 100% (04 Aug 2024 03:00) (99% - 100%)    Parameters below as of 03 Aug 2024 08:30  Patient On (Oxygen Delivery Method): ventilator, RR18    O2 Concentration (%): 35    ABG - ( 03 Aug 2024 08:15 )  pH, Arterial: 7.46  pH, Blood: x     /  pCO2: 34    /  pO2: 226   / HCO3: 24    / Base Excess: 0.8   /  SaO2: 99.3        I&O's Detail    02 Aug 2024 07:01  -  03 Aug 2024 07:00  --------------------------------------------------------  IN:    EPINEPHrine: 514.8 mL    FentaNYL: 43.2 mL    Norepinephrine: 8.4 mL    Sodium Bicarbonate: 900 mL    Sodium Bicarbonate: 700 mL    Vasopressin: 6 mL  Total IN: 2172.4 mL    OUT:    Indwelling Catheter - Urethral (mL): 665 mL    Stool (mL): 1 mL    Voided (mL): 220 mL  Total OUT: 886 mL  Total NET: 1286.4 mL      03 Aug 2024 07:01  -  04 Aug 2024 04:46  --------------------------------------------------------  IN:    IV PiggyBack: 300 mL    Norepinephrine: 1.4 mL    Sodium Bicarbonate: 500 mL  Total IN: 801.4 mL    OUT:    Indwelling Catheter - Urethral (mL): 585 mL    Stool (mL): 1 mL  Total OUT: 586 mL  Total NET: 215.4 mL      LABS:                        10.8   21.21 )-----------( 163      ( 04 Aug 2024 02:20 )             33.2     08-04    144  |  109<H>  |  42<H>  ----------------------------<  146<H>  4.6   |  26  |  2.99<H>    Ca    7.2<L>      04 Aug 2024 02:20  Phos  3.6     08-04  Mg     2.2     08-04    TPro  6.4  /  Alb  2.8<L>  /  TBili  0.7  /  DBili  x   /  AST  207<H>  /  ALT  83<H>  /  AlkPhos  84  08-04      CAPILLARY BLOOD GLUCOSE  POCT Blood Glucose.: 134 mg/dL (03 Aug 2024 23:22)      PT/INR - ( 02 Aug 2024 14:00 )   PT: 12.1 sec;   INR: 1.02 ratio     PTT - ( 02 Aug 2024 14:00 )  PTT:45.3 sec      Urinalysis Basic - ( 04 Aug 2024 02:20 )  Color: x / Appearance: x / SG: x / pH: x  Gluc: 146 mg/dL / Ketone: x  / Bili: x / Urobili: x   Blood: x / Protein: x / Nitrite: x   Leuk Esterase: x / RBC: x / WBC x   Sq Epi: x / Non Sq Epi: x / Bacteria: x      CULTURES:  Culture Results:   No growth (08-02 @ 15:46)  Culture Results:   No growth at 24 hours (08-02 @ 15:46)  Culture Results:   No growth at 24 hours (08-02 @ 15:46)      RADIOLOGY:   < from: CT Head No Cont (08.02.24 @ 15:04) >    ACC: [id number] EXAM:  CT BRAIN   ORDERED BY: Rowena Jean     PROCEDURE DATE:  08/02/2024      INTERPRETATION:  CLINICAL INFORMATION: Status post arrest, found on   ground.    COMPARISON: None.    CONTRAST:  IV Contrast: None  Complications: None reported at time of study completion    TECHNIQUE:  Serial axial images were obtained from the skull base to the   vertex using multi-slice helical technique. Sagittal and coronal   reformats were obtained.    FINDINGS:    VENTRICLES AND SULCI: Age appropriate involutional changes.  INTRA-AXIAL: Decrease discrimination of the cortical gray matter and   insular ribbons. Faint definition of the deep gray matter structures. No   mass effect, acute hemorrhage, or midline shift.  There are   periventricular and subcortical white matter hypodensities, consistent   with microvascular type changes.  EXTRA-AXIAL: No mass or fluid collection. Basal cisterns are normal in   appearance.    VISUALIZED SINUSES:  Scattered mucosal thickening.  TYMPANOMASTOID CAVITIES:  Clear.  VISUALIZED ORBITS: The orbits are mildly proptotic  CALVARIUM: Intact.    MISCELLANEOUS: Fluid level in the posterior nasopharynx.    IMPRESSION:    Decrease discrimination of the cortical gray matter and insular ribbons   with only faint definition of the deep gray matter structures. 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 Recommend MRI of the brain for further evaluation.    This report was discussed with Dr. Miranda MD at 8/2/2024 3:36 PM via Dr. Crowell.    --- End of Report ---    Whitley Crowell DO  This document has been electronically signed.  Delroy Barnes MD  This document has been electronically signed. Aug  2 2024  3:37PM    < end of copied text >   Patient is a 79y old  Male who presents with a chief complaint of Cardiac arrest (03 Aug 2024 08:03)      BRIEF HOSPITAL COURSE:   78 yo m pmhx parkinson's, HTN, HLD, prostate ca s/p prostatectomy, partial thyroidectomy admitted s/p OOH cardiac arrest, shock, hypoxic respiratory failure, PATRICIA, transaminitis, MA and anoxic brain injury.     Events last 24 hours:   hypertensive this am, ordered for hydralazine ivp.        PAST MEDICAL & SURGICAL HISTORY:      Allergies  No Known Allergies      FAMILY HISTORY:  unknown      Social History:   from home      Review of Systems:  unable to obtain 2/2 ms      Physical Examination:    General: elderly male, lying in bed    HEENT: ett and ogt in place    PULM: grossly cta b/l    CVS: rrr    ABD: Soft, nondistended, nontender, +bs    EXT: + edema, nontender    SKIN: Warm     NEURO: unresponsive, pupils fixed and dilated. does not withdrawal to pain. intermittently over breathing vent      Medications:  cefTRIAXone   IVPB 1000 milliGRAM(s) IV Intermittent every 24 hours  carbidopa/levodopa  25/100 1 Tablet(s) Oral three times a day  heparin   Injectable 5000 Unit(s) SubCutaneous every 8 hours  pantoprazole  Injectable 40 milliGRAM(s) IV Push daily  hydrocortisone sodium succinate Injectable 100 milliGRAM(s) IV Push every 8 hours  insulin lispro (ADMELOG) corrective regimen sliding scale   SubCutaneous every 6 hours  chlorhexidine 0.12% Liquid 15 milliLiter(s) Oral Mucosa every 12 hours  chlorhexidine 2% Cloths 1 Application(s) Topical <User Schedule>      Mode: AC/ CMV (Assist Control/ Continuous Mandatory Ventilation)  RR (machine): 18  TV (machine): 450  FiO2: 35  PEEP: 5  ITime: 0.7  MAP: 9  PIP: 28      ICU Vital Signs Last 24 Hrs  T(C): 38 (04 Aug 2024 02:00), Max: 38 (04 Aug 2024 02:00)  T(F): 100.4 (04 Aug 2024 02:00), Max: 100.4 (04 Aug 2024 02:00)  HR: 73 (04 Aug 2024 03:00) (49 - 78)  BP: 138/79 (04 Aug 2024 02:00) (138/79 - 138/79)  BP(mean): 96 (04 Aug 2024 02:00) (96 - 96)  ABP: 174/80 (04 Aug 2024 03:00) (100/53 - 174/80)  ABP(mean): 114 (04 Aug 2024 03:00) (68 - 114)  RR: 19 (04 Aug 2024 03:00) (12 - 29)  SpO2: 100% (04 Aug 2024 03:00) (99% - 100%)    O2 Parameters below as of 03 Aug 2024 08:30  Patient On (Oxygen Delivery Method): ventilator, RR18    O2 Concentration (%): 35      Vital Signs Last 24 Hrs  T(C): 38 (04 Aug 2024 02:00), Max: 38 (04 Aug 2024 02:00)  T(F): 100.4 (04 Aug 2024 02:00), Max: 100.4 (04 Aug 2024 02:00)  HR: 73 (04 Aug 2024 03:00) (49 - 78)  BP: 138/79 (04 Aug 2024 02:00) (138/79 - 138/79)  BP(mean): 96 (04 Aug 2024 02:00) (96 - 96)  RR: 19 (04 Aug 2024 03:00) (12 - 29)  SpO2: 100% (04 Aug 2024 03:00) (99% - 100%)    Parameters below as of 03 Aug 2024 08:30  Patient On (Oxygen Delivery Method): ventilator, RR18    O2 Concentration (%): 35    ABG - ( 03 Aug 2024 08:15 )  pH, Arterial: 7.46  pH, Blood: x     /  pCO2: 34    /  pO2: 226   / HCO3: 24    / Base Excess: 0.8   /  SaO2: 99.3        I&O's Detail    02 Aug 2024 07:01  -  03 Aug 2024 07:00  --------------------------------------------------------  IN:    EPINEPHrine: 514.8 mL    FentaNYL: 43.2 mL    Norepinephrine: 8.4 mL    Sodium Bicarbonate: 900 mL    Sodium Bicarbonate: 700 mL    Vasopressin: 6 mL  Total IN: 2172.4 mL    OUT:    Indwelling Catheter - Urethral (mL): 665 mL    Stool (mL): 1 mL    Voided (mL): 220 mL  Total OUT: 886 mL  Total NET: 1286.4 mL      03 Aug 2024 07:01  -  04 Aug 2024 04:46  --------------------------------------------------------  IN:    IV PiggyBack: 300 mL    Norepinephrine: 1.4 mL    Sodium Bicarbonate: 500 mL  Total IN: 801.4 mL    OUT:    Indwelling Catheter - Urethral (mL): 585 mL    Stool (mL): 1 mL  Total OUT: 586 mL  Total NET: 215.4 mL      LABS:                        10.8   21.21 )-----------( 163      ( 04 Aug 2024 02:20 )             33.2     08-04    144  |  109<H>  |  42<H>  ----------------------------<  146<H>  4.6   |  26  |  2.99<H>    Ca    7.2<L>      04 Aug 2024 02:20  Phos  3.6     08-04  Mg     2.2     08-04    TPro  6.4  /  Alb  2.8<L>  /  TBili  0.7  /  DBili  x   /  AST  207<H>  /  ALT  83<H>  /  AlkPhos  84  08-04      CAPILLARY BLOOD GLUCOSE  POCT Blood Glucose.: 134 mg/dL (03 Aug 2024 23:22)      PT/INR - ( 02 Aug 2024 14:00 )   PT: 12.1 sec;   INR: 1.02 ratio     PTT - ( 02 Aug 2024 14:00 )  PTT:45.3 sec      Urinalysis Basic - ( 04 Aug 2024 02:20 )  Color: x / Appearance: x / SG: x / pH: x  Gluc: 146 mg/dL / Ketone: x  / Bili: x / Urobili: x   Blood: x / Protein: x / Nitrite: x   Leuk Esterase: x / RBC: x / WBC x   Sq Epi: x / Non Sq Epi: x / Bacteria: x      CULTURES:  Culture Results:   No growth (08-02 @ 15:46)  Culture Results:   No growth at 24 hours (08-02 @ 15:46)  Culture Results:   No growth at 24 hours (08-02 @ 15:46)      RADIOLOGY:   < from: CT Head No Cont (08.02.24 @ 15:04) >    ACC: [id number] EXAM:  CT BRAIN   ORDERED BY: Rowena Jean     PROCEDURE DATE:  08/02/2024      INTERPRETATION:  CLINICAL INFORMATION: Status post arrest, found on   ground.    COMPARISON: None.    CONTRAST:  IV Contrast: None  Complications: None reported at time of study completion    TECHNIQUE:  Serial axial images were obtained from the skull base to the   vertex using multi-slice helical technique. Sagittal and coronal   reformats were obtained.    FINDINGS:    VENTRICLES AND SULCI: Age appropriate involutional changes.  INTRA-AXIAL: Decrease discrimination of the cortical gray matter and   insular ribbons. Faint definition of the deep gray matter structures. No   mass effect, acute hemorrhage, or midline shift.  There are   periventricular and subcortical white matter hypodensities, consistent   with microvascular type changes.  EXTRA-AXIAL: No mass or fluid collection. Basal cisterns are normal in   appearance.    VISUALIZED SINUSES:  Scattered mucosal thickening.  TYMPANOMASTOID CAVITIES:  Clear.  VISUALIZED ORBITS: The orbits are mildly proptotic  CALVARIUM: Intact.    MISCELLANEOUS: Fluid level in the posterior nasopharynx.    IMPRESSION:    Decrease discrimination of the cortical gray matter and insular ribbons   with only faint definition of the deep gray matter structures. 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 Recommend MRI of the brain for further evaluation.    This report was discussed with Dr. Miranda MD at 8/2/2024 3:36 PM via Dr. Crowell.    --- End of Report ---    Whitley Crowell DO  This document has been electronically signed.  Delroy Barnes MD  This document has been electronically signed. Aug  2 2024  3:37PM    < end of copied text >

## 2024-08-05 NOTE — PROGRESS NOTE ADULT - ASSESSMENT
78 y/o M w/parkinson's and hx of prostate CA presenting following multiple PEA arrests. Acute respiratory failure with hypoxia and hypercapnia secondary to arrest. Likely severe anoxic brain injury. Hypotension likely post arrest vs severe sepsis with septic shock. PATRICIA likely ATN.     Neuro: Severe anoxic injury wiht only over breathing the vent. No other reflexes on exam.   - continue  tih parkinson's meds.   CV: HD stable now, after hypoxic arrest from PNA   - wean hydrocort  Pulm:Reolving pseudomaonal PNA  - will complete 7 days of abx.   Renal: Improving ATN  - monitor urine out put and bmp QOD.   GI:Tube feeds. Protonix  Endo: FSq6  ID: PNA on zosyn, for psudomonas, improving.   Prophylaxis:HSQ  Ethics: DNR/I need to discuss further steps with family unlikly to have any meaningfull recovery.   Labs QOD.

## 2024-08-05 NOTE — DIETITIAN INITIAL EVALUATION ADULT - OTHER INFO
[address] [address]  Daily Progress Note  Patient name: Joseph Wong  MRN: [id number]   LOS: 25 days     Subjective   Patient resting comfortably.  Objective   Vitals:Temp:  [36.1 °C (97 °F)-36.4 °C (97.6 °F)] 36.1 °C (97 °F)  Heart Rate:  [68-77] 68  Resp:  [18-19] 18  SpO2:  [90 %-94 %] 94 %  BP: (107-149)/(75-93) 149/93  Physical Exam:  HEENT: Negative  Neck: No neck vein distention  Lungs: Clear to auscultation  Heart: Regular  Abdomen: Soft  Genitourinary: Deferred  Extremities: No significant edema  Skin: No rash  Neurologic: Alert, oriented to person    Review of Systems:  Negative  No results displayed because visit has over 200 results.           Assessment/Plan   Left knee pain with associated effusion, resolved                      Left total knee arthroplasty, 1/22  T12 and L4 compression deformities/fractures  Generalized weakness  Dementia  B12 and vitamin D deficiency  Type 2 diabetes mellitus              Neuropathy history  Obstructive sleep apnea  Gastroesophageal reflux disease history  Coronary artery disease history  Benign prostatic hypertrophy history  Mood disorder history     (Disposition planning.  Patient appropriate for discharge.) 12/28/23     (Disposition planning.) 12/29/23     (Disposition planning.) 12/30/23     (Disposition planning.) 12/31/23    Disposition planning       Electronically signed by: Sami Olsen MD  1/1/2024  10:38 AM                 Unable to interview pt due to mental status. Per medical record family found pt unresponsive on floor shortly after he reported not feeling well; EMS called; in cardiac arrest 4 times; pt remains unresponsive at this time; most likely c anoxic brain injury.  Pt is DNR/I status.

## 2024-08-05 NOTE — DIETITIAN INITIAL EVALUATION ADULT - PERTINENT LABORATORY DATA
08-05    145  |  110<H>  |  43<H>  ----------------------------<  140<H>  4.1   |  26  |  2.37<H>    Ca    7.6<L>      05 Aug 2024 05:39  Phos  3.3     08-05  Mg     2.4     08-05    TPro  6.3  /  Alb  2.8<L>  /  TBili  0.8  /  DBili  x   /  AST  140<H>  /  ALT  54  /  AlkPhos  78  08-05  POCT Blood Glucose.: 124 mg/dL (08-05-24); 08-04 138, 155, 140, 168  A1C with Estimated Average Glucose Result: 5.4 %, 108 (08-03-24)

## 2024-08-05 NOTE — PROVIDER CONTACT NOTE (EICU) - ACTION/TREATMENT ORDERED:
EMR vent orders updated to reflect current vent settings noted during daily EICU vent rounds
vent orders updated to reflect current ventilator settings noted during eICU ventilator rounds.

## 2024-08-05 NOTE — PATIENT PROFILE ADULT - FALL HARM RISK - HARM RISK INTERVENTIONS

## 2024-08-05 NOTE — DIETITIAN INITIAL EVALUATION ADULT - NSFNSGIIOFT_GEN_A_CORE
08-04-24 @ 07:01  -  08-05-24 @ 07:00  --------------------------------------------------------  OUT:    Stool (mL): 2 mL  Total OUT: 2 mL    Total NET: -2 mL

## 2024-08-05 NOTE — PROGRESS NOTE ADULT - SUBJECTIVE AND OBJECTIVE BOX
CHIEF COMPLAINT:Patient is a 79y old  Male who presents with a chief complaint of CARDIAC ARREST     (05 Aug 2024 13:40)        Interval Events:    REVIEW OF SYSTEMS:    [ ] All other systems negative  [ x] Unable to assess ROS because ________anoxic injury.     OBJECTIVE:  ICU Vital Signs Last 24 Hrs  T(C): 34.5 (05 Aug 2024 11:00), Max: 36.7 (04 Aug 2024 15:00)  T(F): 94.1 (05 Aug 2024 11:00), Max: 98 (04 Aug 2024 15:00)  HR: 63 (05 Aug 2024 12:13) (48 - 63)  BP: 173/91 (05 Aug 2024 04:00) (145/84 - 186/89)  BP(mean): 114 (05 Aug 2024 04:00) (100 - 126)  ABP: 141/76 (05 Aug 2024 11:00) (131/86 - 180/86)  ABP(mean): 99 (05 Aug 2024 11:00) (98 - 158)  RR: 14 (05 Aug 2024 11:00) (10 - 15)  SpO2: 99% (05 Aug 2024 12:13) (99% - 100%)    O2 Parameters below as of 05 Aug 2024 08:00  Patient On (Oxygen Delivery Method): ventilator          Mode: AC/ CMV (Assist Control/ Continuous Mandatory Ventilation), RR (machine): 14, TV (machine): 450, FiO2: 35, PEEP: 5, ITime: 1, MAP: 8, PIP: 14    08-04 @ 07:01  -  08-05 @ 07:00  --------------------------------------------------------  IN: 175 mL / OUT: 1167 mL / NET: -992 mL      CAPILLARY BLOOD GLUCOSE      POCT Blood Glucose.: 124 mg/dL (05 Aug 2024 11:34)      PHYSICAL EXAM:  Gen: Elderly male lying in bed, intubated, NAD  HEENT: NC/AT sclerae anicteric  Resp: Mechanical breath sounds b/l, overbreathing vent  CV: S1, S2  Abd: Soft, + BS  Ext: WWP  Neuro: Unarousable, absent pupillary and GAG reflexes      LINES:    HOSPITAL MEDICATIONS:  Standing Meds:  carbidopa/levodopa  25/100 1 Tablet(s) Oral three times a day  chlorhexidine 0.12% Liquid 15 milliLiter(s) Oral Mucosa every 12 hours  chlorhexidine 2% Cloths 1 Application(s) Topical <User Schedule>  heparin   Injectable 5000 Unit(s) SubCutaneous every 8 hours  hydrocortisone sodium succinate Injectable 100 milliGRAM(s) IV Push every 8 hours  insulin lispro (ADMELOG) corrective regimen sliding scale   SubCutaneous every 6 hours  pantoprazole  Injectable 40 milliGRAM(s) IV Push daily  piperacillin/tazobactam IVPB.. 3.375 Gram(s) IV Intermittent every 12 hours      PRN Meds:      LABS:                        10.6   20.93 )-----------( 147      ( 05 Aug 2024 05:39 )             32.5     Hgb Trend: 10.6<--, 10.8<--, 11.9<--, 12.8<--, 11.5<--  08-05    145  |  110<H>  |  43<H>  ----------------------------<  140<H>  4.1   |  26  |  2.37<H>    Ca    7.6<L>      05 Aug 2024 05:39  Phos  3.3     08-05  Mg     2.4     08-05    TPro  6.3  /  Alb  2.8<L>  /  TBili  0.8  /  DBili  x   /  AST  140<H>  /  ALT  54  /  AlkPhos  78  08-05    Creatinine Trend: 2.37<--, 2.99<--, 2.54<--, 2.44<--, 2.41<--, 2.21<--    Urinalysis Basic - ( 05 Aug 2024 05:39 )    Color: x / Appearance: x / SG: x / pH: x  Gluc: 140 mg/dL / Ketone: x  / Bili: x / Urobili: x   Blood: x / Protein: x / Nitrite: x   Leuk Esterase: x / RBC: x / WBC x   Sq Epi: x / Non Sq Epi: x / Bacteria: x            MICROBIOLOGY:     Culture - Sputum (collected 03 Aug 2024 00:00)  Source: ET Tube ET Tube  Gram Stain (05 Aug 2024 08:38):    No polymorphonuclear leukocytes per low power field    Numerous Squamous epithelial cells per low power field    Numerous Gram positive cocci in pairs per oil power field    Numerous Gram Negative Rods per oil power field    Numerous Gram Positive Rods peroil power field  Final Report (05 Aug 2024 08:38):    Moderate Pseudomonas aeruginosa    Normal Respiratory Carol present  Organism: Pseudomonas aeruginosa (05 Aug 2024 08:38)  Organism: Pseudomonas aeruginosa (05 Aug 2024 08:38)    Culture - Blood (collected 02 Aug 2024 15:46)  Source: .Blood Blood-Peripheral  Preliminary Report (04 Aug 2024 23:01):    No growth at 48 Hours    Culture - Blood (collected 02 Aug 2024 15:46)  Source: .Blood Blood-Peripheral  Preliminary Report (04 Aug 2024 23:01):    No growth at 48 Hours    Culture - Urine (collected 02 Aug 2024 15:46)  Source: Clean Catch Clean Catch (Midstream)  Final Report (03 Aug 2024 22:54):    No growth      RADIOLOGY:  [ ] Reviewed and interpreted by me    EKG:

## 2024-08-05 NOTE — DIETITIAN INITIAL EVALUATION ADULT - NS FNS DIET ORDER
Diet, NPO:   Except Medications     Special Instructions for Nursing:  Except Medications (08-02-24 @ 14:51)

## 2024-08-06 NOTE — PROGRESS NOTE ADULT - ASSESSMENT
80 y/o M w/parkinson's and hx of prostate CA presenting following multiple PEA arrests. Acute respiratory failure with hypoxia and hypercapnia secondary to arrest. Likely severe anoxic brain injury. Hypotension likely post arrest vs severe sepsis with septic shock. PATRICIA likely ATN.     Neuro: Severe anoxic injury wiht only over breathing the vent. No other reflexes on exam.   - continue  tih parkinson's meds.   CV: HD stable now, after hypoxic arrest from PNA   - wean hydrocort  Pulm:Reolving pseudomaonal PNA  - will complete 7 days of abx.   Renal: Improving ATN  - monitor urine out put and bmp QOD.   GI:Tube feeds. Protonix  Endo: FSq6  ID: PNA on zosyn, for psudomonas, improving.   Prophylaxis:HSQ  Ethics: DNR/I need to discuss further steps with family unlikly to have any meaningfully recovery.   Labs QOD.  80 y/o M w/parkinson's and hx of prostate CA presenting following multiple PEA arrests. Acute respiratory failure with hypoxia and hypercapnia secondary to arrest. Likely severe anoxic brain injury. Hypotension likely post arrest vs severe sepsis with septic shock. PATRICIA likely ATN.     Neuro: Severe anoxic injury wiht only over breathing the vent. No other reflexes on exam.   - continue  tih parkinson's meds.   CV: HD stable now, after hypoxic arrest from PNA   - wean hydrocort  Pulm:Reolving pseudomaonal PNA  - will complete 7 days of abx.   Renal: Improving ATN  - monitor urine out put and bmp QOD.   GI:Tube feeds. Protonix  Endo: FSq6  ID: PNA on zosyn, for Pseudomonas improving.   Prophylaxis:HSQ  Ethics: DNR/I  Discussed case with family and poor prognosis, with brother, and daughter as aweel as others on conferense call. Will discuss his wishes and plan to let us know about result soon. Explained that he will remian in this state with little to no hope for improvement and will likely die from infection even with tracheostomy and that trach and life and a nursing home vs palliative extubation are the only option

## 2024-08-06 NOTE — PHARMACOTHERAPY INTERVENTION NOTE - COMMENTS
As discussed with team, order for hydrocortisone IV discontinued at this time as patient has been hemodynamically stable.
As per policy, piperacillin-tazobactam IV 3.375 grams q12h adjusted to 4.5 grams IV q8h based on current renal function (GFR/CrCl > 20 mL/min) for treatment of pseudomonal pneumonia
Code Blue called for patient. Patient initially brought in by EMS s/p cardiac arrest at home and had achieved ROSC prior to arrival. Upon arrival to ED, patient bradying down to HR 37s and went into cardiac arrest again. Pharmacy at bedside with ED team for management of ACLS medications.    Patient received epinephrine x2, magnesium 2g, calcium chloride 1g, and sodium bicarbonate and achieved ROSC at 13:42 before going back to cardiac arrest at 13:48. Patient currently on epinephrine infusion to maintain SBP 110mmHg.

## 2024-08-06 NOTE — PROGRESS NOTE ADULT - SUBJECTIVE AND OBJECTIVE BOX
CHIEF COMPLAINT:Patient is a 79y old  Male who presents with a chief complaint of Cardiac arrest (05 Aug 2024 14:24)        Interval Events:    REVIEW OF SYSTEMS:    [x] Unable to assess ROS because ________ anoxic    OBJECTIVE:  ICU Vital Signs Last 24 Hrs  T(C): 36.5 (06 Aug 2024 07:00), Max: 38 (06 Aug 2024 00:00)  T(F): 97.7 (06 Aug 2024 07:00), Max: 100.4 (06 Aug 2024 00:00)  HR: 63 (06 Aug 2024 07:00) (59 - 84)  BP: 116/68 (06 Aug 2024 07:00) (116/68 - 160/90)  BP(mean): 83 (06 Aug 2024 07:00) (83 - 111)  ABP: 142/75 (05 Aug 2024 13:00) (138/76 - 151/81)  ABP(mean): 99 (05 Aug 2024 13:00) (98 - 107)  RR: 14 (06 Aug 2024 07:00) (13 - 16)  SpO2: 100% (06 Aug 2024 07:00) (97% - 100%)    O2 Parameters below as of 06 Aug 2024 07:00  Patient On (Oxygen Delivery Method): ventilator          Mode: AC/ CMV (Assist Control/ Continuous Mandatory Ventilation), RR (machine): 14, TV (machine): 450, FiO2: 30, PEEP: 5, ITime: 0.9, MAP: 8, PIP: 21    08-05 @ 07:01  -  08-06 @ 07:00  --------------------------------------------------------  IN: 360 mL / OUT: 932 mL / NET: -572 mL      CAPILLARY BLOOD GLUCOSE      POCT Blood Glucose.: 146 mg/dL (06 Aug 2024 05:52)      PHYSICAL EXAM:  Gen: Elderly male lying in bed, intubated, NAD  HEENT: NC/AT sclerae anicteric  Resp: Mechanical breath sounds b/l, overbreathing vent  CV: S1, S2  Abd: Soft, + BS  Ext: WWP  Neuro: Unarousable, absent pupillary and GAG reflexes  LINES:    HOSPITAL MEDICATIONS:  Standing Meds:  carbidopa/levodopa  25/100 1 Tablet(s) Oral three times a day  chlorhexidine 0.12% Liquid 15 milliLiter(s) Oral Mucosa every 12 hours  chlorhexidine 2% Cloths 1 Application(s) Topical <User Schedule>  heparin   Injectable 5000 Unit(s) SubCutaneous every 8 hours  hydrocortisone sodium succinate Injectable 50 milliGRAM(s) IV Push every 8 hours  insulin lispro (ADMELOG) corrective regimen sliding scale   SubCutaneous every 6 hours  pantoprazole  Injectable 40 milliGRAM(s) IV Push daily  piperacillin/tazobactam IVPB.. 3.375 Gram(s) IV Intermittent every 12 hours      PRN Meds:      LABS:                        10.6   20.93 )-----------( 147      ( 05 Aug 2024 05:39 )             32.5     Hgb Trend: 10.6<--, 10.8<--, 11.9<--, 12.8<--, 11.5<--  08-05    145  |  110<H>  |  43<H>  ----------------------------<  140<H>  4.1   |  26  |  2.37<H>    Ca    7.6<L>      05 Aug 2024 05:39  Phos  3.3     08-05  Mg     2.4     08-05    TPro  6.3  /  Alb  2.8<L>  /  TBili  0.8  /  DBili  x   /  AST  140<H>  /  ALT  54  /  AlkPhos  78  08-05    Creatinine Trend: 2.37<--, 2.99<--, 2.54<--, 2.44<--, 2.41<--, 2.21<--    Urinalysis Basic - ( 05 Aug 2024 05:39 )    Color: x / Appearance: x / SG: x / pH: x  Gluc: 140 mg/dL / Ketone: x  / Bili: x / Urobili: x   Blood: x / Protein: x / Nitrite: x   Leuk Esterase: x / RBC: x / WBC x   Sq Epi: x / Non Sq Epi: x / Bacteria: x            MICROBIOLOGY:     RADIOLOGY:  [ ] Reviewed and interpreted by me    EKG:

## 2024-08-07 NOTE — PROGRESS NOTE ADULT - SUBJECTIVE AND OBJECTIVE BOX
CHIEF COMPLAINT:Patient is a 79y old  Male who presents with a chief complaint of Cardiac arrest (06 Aug 2024 08:07)        Interval Events:    REVIEW OF SYSTEMS:    [x] Unable to assess ROS because ________Anoxic     OBJECTIVE:  ICU Vital Signs Last 24 Hrs  T(C): 34.6 (07 Aug 2024 06:00), Max: 36.2 (06 Aug 2024 09:00)  T(F): 94.3 (07 Aug 2024 06:00), Max: 97.2 (06 Aug 2024 09:00)  HR: 55 (07 Aug 2024 06:00) (50 - 61)  BP: 98/70 (07 Aug 2024 06:00) (98/70 - 160/86)  BP(mean): 80 (07 Aug 2024 06:00) (78 - 109)  ABP: --  ABP(mean): --  RR: 14 (07 Aug 2024 06:00) (0 - 15)  SpO2: 96% (07 Aug 2024 06:00) (96% - 100%)    O2 Parameters below as of 06 Aug 2024 19:05  Patient On (Oxygen Delivery Method): ventilator, vt 450 peep 5 rr14 Fio2 30%          Mode: AC/ CMV (Assist Control/ Continuous Mandatory Ventilation), RR (machine): 14, TV (machine): 450, FiO2: 30, PEEP: 5, ITime: 0.9, MAP: 7, PIP: 19    08-06 @ 07:01  -  08-07 @ 07:00  --------------------------------------------------------  IN: 860 mL / OUT: 745 mL / NET: 115 mL      CAPILLARY BLOOD GLUCOSE      POCT Blood Glucose.: 129 mg/dL (07 Aug 2024 05:13)      PHYSICAL EXAM:  Gen: Elderly male lying in bed, intubated, NAD  HEENT: NC/AT sclerae anicteric  Resp: Mechanical breath sounds b/l, overbreathing vent  CV: S1, S2  Abd: Soft, + BS  Ext: WWP  Neuro: Unarousable, absent pupillary and GAG reflexes  LINES:    LINES:    HOSPITAL MEDICATIONS:  Standing Meds:  carbidopa/levodopa  25/100 1 Tablet(s) Oral three times a day  chlorhexidine 0.12% Liquid 15 milliLiter(s) Oral Mucosa every 12 hours  chlorhexidine 2% Cloths 1 Application(s) Topical <User Schedule>  heparin   Injectable 5000 Unit(s) SubCutaneous every 8 hours  insulin lispro (ADMELOG) corrective regimen sliding scale   SubCutaneous every 6 hours  piperacillin/tazobactam IVPB.. 4.5 Gram(s) IV Intermittent every 8 hours      PRN Meds:      LABS:                        9.5    13.15 )-----------( 165      ( 07 Aug 2024 03:30 )             29.7     Hgb Trend: 9.5<--, 10.6<--, 10.8<--, 11.9<--, 12.8<--  08-07    147<H>  |  114<H>  |  41<H>  ----------------------------<  146<H>  3.2<L>   |  29  |  1.89<H>    Ca    8.1<L>      07 Aug 2024 03:30  Phos  1.8     08-07  Mg     2.5     08-07    TPro  5.8<L>  /  Alb  2.5<L>  /  TBili  0.5  /  DBili  x   /  AST  68<H>  /  ALT  36  /  AlkPhos  69  08-07    Creatinine Trend: 1.89<--, 2.37<--, 2.99<--, 2.54<--, 2.44<--, 2.41<--    Urinalysis Basic - ( 07 Aug 2024 03:30 )    Color: x / Appearance: x / SG: x / pH: x  Gluc: 146 mg/dL / Ketone: x  / Bili: x / Urobili: x   Blood: x / Protein: x / Nitrite: x   Leuk Esterase: x / RBC: x / WBC x   Sq Epi: x / Non Sq Epi: x / Bacteria: x            MICROBIOLOGY:     RADIOLOGY:  [ ] Reviewed and interpreted by me    EKG:

## 2024-08-07 NOTE — PROGRESS NOTE ADULT - ASSESSMENT
78 y/o M w/parkinson's and hx of prostate CA presenting following multiple PEA arrests. Acute respiratory failure with hypoxia and hypercapnia secondary to arrest. Likely severe anoxic brain injury. Hypotension likely post arrest vs severe sepsis with septic shock. PATRICIA likely ATN.     Neuro: Severe anoxic injury wiht only over breathing the vent. No other reflexes on exam.   - continue  tih parkinson's meds.   CV: HD stable now, after hypoxic arrest from PNA   - wean hydrocort  Pulm:Reolving pseudomaonal PNA  - will complete 7 days of abx.   Renal: Improving ATN  - monitor urine out put and bmp QOD.   GI:Tube feeds. Protonix  Endo: FSq6  ID: PNA on zosyn, for Pseudomonas improving.   Prophylaxis:HSQ  Ethics: DNR/I  Discussed case with family and poor prognosis, with brother, and daughter as aweel as others on conferense call. Will discuss his wishes and plan to let us know about result soon. Explained that he will remian in this state with little to no hope for improvement and will likely die from infection even with tracheostomy and that trach and life and a nursing home vs palliative extubation are the only option   80 y/o M w/parkinson's and hx of prostate CA presenting following multiple PEA arrests. Acute respiratory failure with hypoxia and hypercapnia secondary to arrest. Likely severe anoxic brain injury. Hypotension likely post arrest vs severe sepsis with septic shock. PATRICIA likely ATN.     Neuro: Severe anoxic injury wiht only over breathing the vent. No other reflexes on exam.   - continue  tih parkinson's meds.   CV: HD stable now, after hypoxic arrest from PNA   - wean hydrocort  Pulm:Reolving pseudomaonal PNA  - will complete 7 days of abx.   Renal: Improving ATN  - monitor urine out put and bmp QOD.   GI:Tube feeds. Protonix  Endo: FSq6  ID: PNA on zosyn, for Pseudomonas improving.   Prophylaxis:HSQ  Ethics: DNR/I  Discussed case with family and poor prognosis, with brother, and daughter as well as others on conference call.   Plan for palliative extubation with family around tomorrow.

## 2024-08-08 NOTE — DISCHARGE NOTE FOR THE EXPIRED PATIENT - OTHER SIGNIFICANT FINDINGS
ADDENDUM  Patient was admitted to ICU after having PEA arrest, with three episodes. Post-arrest, he had further clinical decompensation and showed signs of severe anoxic injury. While he was on empiric antibiotics, sepsis was not the likely cause of his condition, but rather the effects of his cardiac arrest / cardiogenic shock. He was terminally weaned with family at bedside after withdrawal of care.  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

## 2024-08-08 NOTE — PROGRESS NOTE ADULT - SUBJECTIVE AND OBJECTIVE BOX
HPI;  79 year old male PMHx parkinson's and prostate CA admitted to ICU after having multiple PEA arrests at home . Acute respiratory failure with hypoxia and hypercapnia secondary to arrest and progression with severe anoxic brain injury and shock state likely post arrest vs septic shock.  The family had decided 2 days ago for him to be DNR and have been informed by the team of the grave prognosis and they did not wish for trach/ peg. As result the plan was to perform a terminal wean possibly later today, but tonight he began to worsen his condition and develop shock state with blood pressure dropping to have maps in the low 40's     I called the family to inform them and they were allowed visitation, on arrival I had further goals of care discussion with the daughter who requested that there be no more suffering and wanted terminal wean to happen now given the last family member ( an uncle ) was liekly not coming today. A new molst form was filled out to include comfort measures only to the DNR/DNI and terminal wean as well.     Plan   -terminal extubation to supplemental oxygen   -comfort measures only including IV morphine for respiratory distress/comfort   -family at the bedside     Critical Care time: 35 mins assessing presenting problems of acute illness that poses high probability of life threatening deterioration or end organ damage/dysfunction.  Medical decision making including Initiating plan of care, reviewing data, reviewing radiology, direct patient bedside evaluation and interpretation of vital signs, any necessary ventilator management , discusion with multidisciplinary team, discussing goals of care with patient/family, all non inclusive of procedures

## 2024-08-08 NOTE — AIRWAY REMOVAL NOTE  ADULT & PEDS - ARTIFICAL AIRWAY REMOVAL COMMENTS
Written order for extubation verified. The patient was identified by full name and birth date compared to the identification band.  The RN was present during the procedure.

## 2024-08-08 NOTE — DISCHARGE NOTE FOR THE EXPIRED PATIENT - HOSPITAL COURSE
79 year old male PMHx parkinson's and prostate CA admitted to ICU after having multiple PEA arrests at home . Acute respiratory failure with hypoxia and hypercapnia secondary to arrest and progression with severe anoxic brain injury and shock state likely post arrest vs septic shock.  The family had decided 2 days ago for him to be DNR and have been informed by the team of the grave prognosis and they did not wish for trach/ peg. As result the plan was to perform a terminal wean possibly later today, but tonight he began to worsen his condition and develop shock state with blood pressure dropping to have maps in the low 40's     Tonight the family was called to inform them of the worsening blood pressure and they were allowed visitation, on arrival I had further goals of care discussion with the daughter who requested that there be no more suffering and wanted terminal wean to happen now given the last family member ( an uncle ) was liekly not coming today. A new molst form was filled out to include comfort measures only to the DNR/DNI and terminal wean as well.     Shortly after terminal extubation he became bradycardic progressing to asystole. I examined the patient and found asystole on monitor, no palpable pulses at carotid and femoral locations, the ventilator was disconnected, no spontaneous breath sounds were auscultated via stethoscope. There were no heart sounds auscultated via stethoscope at left and right sternal boarders .The skin was cool and pupils were fixed and dilated without reaction to light and she was pronounced dead at 04:19 with family members at the bedside.